# Patient Record
Sex: MALE | Race: WHITE | NOT HISPANIC OR LATINO | URBAN - METROPOLITAN AREA
[De-identification: names, ages, dates, MRNs, and addresses within clinical notes are randomized per-mention and may not be internally consistent; named-entity substitution may affect disease eponyms.]

---

## 2018-05-20 ENCOUNTER — EMERGENCY (EMERGENCY)
Facility: HOSPITAL | Age: 23
LOS: 1 days | Discharge: HOME | End: 2018-05-20
Attending: EMERGENCY MEDICINE | Admitting: EMERGENCY MEDICINE
Payer: COMMERCIAL

## 2018-05-20 VITALS
SYSTOLIC BLOOD PRESSURE: 121 MMHG | RESPIRATION RATE: 18 BRPM | OXYGEN SATURATION: 100 % | TEMPERATURE: 98 F | HEART RATE: 78 BPM | DIASTOLIC BLOOD PRESSURE: 68 MMHG

## 2018-05-20 PROCEDURE — 99283 EMERGENCY DEPT VISIT LOW MDM: CPT | Mod: 25

## 2018-05-20 NOTE — ED ADULT TRIAGE NOTE - CHIEF COMPLAINT QUOTE
Pt reports right knee injury while at hockey game now c/o redness, swelling, and inability weight bear on extremity. PMH Celiac disease

## 2018-05-21 PROCEDURE — 73562 X-RAY EXAM OF KNEE 3: CPT | Mod: 26,RT

## 2018-05-21 NOTE — ED PROVIDER NOTE - MUSCULOSKELETAL, MLM
TTP over the patella.  No instability appreciated but there is pain with manipulation of the meniscus.  Good distal PMS

## 2018-05-21 NOTE — ED PROVIDER NOTE - OBJECTIVE STATEMENT
23 yo playing hockey went knee to knee with another player.  Now feeling pain and instability with ambulation of the R knee.  No other injuries reported

## 2018-08-17 ENCOUNTER — EMERGENCY (EMERGENCY)
Facility: HOSPITAL | Age: 23
LOS: 0 days | Discharge: HOME | End: 2018-08-17
Attending: EMERGENCY MEDICINE | Admitting: EMERGENCY MEDICINE
Payer: COMMERCIAL

## 2018-08-17 VITALS
DIASTOLIC BLOOD PRESSURE: 69 MMHG | SYSTOLIC BLOOD PRESSURE: 127 MMHG | HEART RATE: 79 BPM | OXYGEN SATURATION: 99 % | RESPIRATION RATE: 16 BRPM | TEMPERATURE: 98 F

## 2018-08-17 VITALS — HEIGHT: 70 IN | WEIGHT: 134.92 LBS

## 2018-08-17 DIAGNOSIS — L29.9 PRURITUS, UNSPECIFIED: ICD-10-CM
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
## 2018-08-17 DIAGNOSIS — S80.861A INSECT BITE (NONVENOMOUS), RIGHT LOWER LEG, INITIAL ENCOUNTER: ICD-10-CM

## 2018-08-17 DIAGNOSIS — M23.50 CHRONIC INSTABILITY OF KNEE, UNSPECIFIED KNEE: ICD-10-CM
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
## 2018-08-17 DIAGNOSIS — S80.862A INSECT BITE (NONVENOMOUS), LEFT LOWER LEG, INITIAL ENCOUNTER: ICD-10-CM

## 2018-08-17 PROCEDURE — 99283 EMERGENCY DEPT VISIT LOW MDM: CPT

## 2018-08-17 RX ADMIN — Medication 200 MILLIGRAM(S): at 22:12

## 2018-08-17 NOTE — ED STATDOCS - OBJECTIVE STATEMENT
22 y/o male with a PMHx torn ACL (surgery scheduled for 8/30/18) presents to the ED c/o multiple insect bites. Pt states he went to the beach, hiking, and a state park today and was walking in tall grasses. Pt reports seeing small bugs on his skin and believes he's removed all of them at time of evaluation. Reports some itching. Denies seeing any large ticks on himself. No other acute complaints at time of eval. NKDA.

## 2018-08-17 NOTE — ED STATDOCS - MEDICAL DECISION MAKING DETAILS
Pt presenting with possible tick exposure. No current ticks. Will treat with prophylactic dose of doxycycline and d/c. Pt has been advised to clean their sheets, clothes, and shower.

## 2018-08-17 NOTE — ED ADULT NURSE NOTE - CHIEF COMPLAINT QUOTE
Pt was at [place] and removed 4 ticks from multiple sites on body, concerned there may be more on his body.

## 2018-08-17 NOTE — ED ADULT NURSE NOTE - NSIMPLEMENTINTERV_GEN_ALL_ED
Implemented All Universal Safety Interventions:  [place] to call system. Call bell, personal items and telephone within reach. Instruct patient to call for assistance. Room bathroom lighting operational. Non-slip footwear when patient is off stretcher. Physically safe environment: no spills, clutter or unnecessary equipment. Stretcher in lowest position, wheels locked, appropriate side rails in place.

## 2018-08-21 PROBLEM — S83.519A SPRAIN OF ANTERIOR CRUCIATE LIGAMENT OF UNSPECIFIED KNEE, INITIAL ENCOUNTER: Chronic | Status: ACTIVE | Noted: 2018-08-17

## 2018-08-24 ENCOUNTER — OUTPATIENT (OUTPATIENT)
Dept: OUTPATIENT SERVICES | Facility: HOSPITAL | Age: 23
LOS: 1 days | End: 2018-08-24
Payer: COMMERCIAL

## 2018-08-24 VITALS
OXYGEN SATURATION: 98 % | TEMPERATURE: 98 F | WEIGHT: 132.06 LBS | RESPIRATION RATE: 17 BRPM | DIASTOLIC BLOOD PRESSURE: 69 MMHG | HEIGHT: 70 IN | SYSTOLIC BLOOD PRESSURE: 112 MMHG | HEART RATE: 74 BPM

## 2018-08-24 DIAGNOSIS — M25.561 PAIN IN RIGHT KNEE: ICD-10-CM

## 2018-08-24 DIAGNOSIS — Z01.818 ENCOUNTER FOR OTHER PREPROCEDURAL EXAMINATION: ICD-10-CM

## 2018-08-24 LAB
ALBUMIN SERPL ELPH-MCNC: 4.3 G/DL — SIGNIFICANT CHANGE UP (ref 3.3–5)
ALP SERPL-CCNC: 55 U/L — SIGNIFICANT CHANGE UP (ref 40–120)
ALT FLD-CCNC: 25 U/L — SIGNIFICANT CHANGE UP (ref 12–78)
ANION GAP SERPL CALC-SCNC: 5 MMOL/L — SIGNIFICANT CHANGE UP (ref 5–17)
AST SERPL-CCNC: 19 U/L — SIGNIFICANT CHANGE UP (ref 15–37)
BILIRUB SERPL-MCNC: 0.5 MG/DL — SIGNIFICANT CHANGE UP (ref 0.2–1.2)
BUN SERPL-MCNC: 20 MG/DL — SIGNIFICANT CHANGE UP (ref 7–23)
CALCIUM SERPL-MCNC: 9.2 MG/DL — SIGNIFICANT CHANGE UP (ref 8.5–10.1)
CHLORIDE SERPL-SCNC: 107 MMOL/L — SIGNIFICANT CHANGE UP (ref 96–108)
CO2 SERPL-SCNC: 30 MMOL/L — SIGNIFICANT CHANGE UP (ref 22–31)
CREAT SERPL-MCNC: 0.8 MG/DL — SIGNIFICANT CHANGE UP (ref 0.5–1.3)
GLUCOSE SERPL-MCNC: 91 MG/DL — SIGNIFICANT CHANGE UP (ref 70–99)
HCT VFR BLD CALC: 41.8 % — SIGNIFICANT CHANGE UP (ref 39–50)
HGB BLD-MCNC: 14.2 G/DL — SIGNIFICANT CHANGE UP (ref 13–17)
MCHC RBC-ENTMCNC: 28.6 PG — SIGNIFICANT CHANGE UP (ref 27–34)
MCHC RBC-ENTMCNC: 34 GM/DL — SIGNIFICANT CHANGE UP (ref 32–36)
MCV RBC AUTO: 84.1 FL — SIGNIFICANT CHANGE UP (ref 80–100)
NRBC # BLD: 0 /100 WBCS — SIGNIFICANT CHANGE UP (ref 0–0)
PLATELET # BLD AUTO: 179 K/UL — SIGNIFICANT CHANGE UP (ref 150–400)
POTASSIUM SERPL-MCNC: 4.4 MMOL/L — SIGNIFICANT CHANGE UP (ref 3.5–5.3)
POTASSIUM SERPL-SCNC: 4.4 MMOL/L — SIGNIFICANT CHANGE UP (ref 3.5–5.3)
PROT SERPL-MCNC: 7.2 G/DL — SIGNIFICANT CHANGE UP (ref 6–8.3)
RBC # BLD: 4.97 M/UL — SIGNIFICANT CHANGE UP (ref 4.2–5.8)
RBC # FLD: 12.3 % — SIGNIFICANT CHANGE UP (ref 10.3–14.5)
SODIUM SERPL-SCNC: 142 MMOL/L — SIGNIFICANT CHANGE UP (ref 135–145)
WBC # BLD: 5.81 K/UL — SIGNIFICANT CHANGE UP (ref 3.8–10.5)
WBC # FLD AUTO: 5.81 K/UL — SIGNIFICANT CHANGE UP (ref 3.8–10.5)

## 2018-08-24 PROCEDURE — 85027 COMPLETE CBC AUTOMATED: CPT

## 2018-08-24 PROCEDURE — 80053 COMPREHEN METABOLIC PANEL: CPT

## 2018-08-24 PROCEDURE — G0463: CPT

## 2018-08-24 PROCEDURE — 36415 COLL VENOUS BLD VENIPUNCTURE: CPT

## 2018-08-24 NOTE — H&P PST ADULT - PROBLEM SELECTOR PLAN 1
PST: CBC,CMP  Medical evaluation with Dr. VALENTE Kelly  All preoperative instructions including CHD cleanse reviewed with patient who verbalized understanding.   Avoid all NSAID/ASA containing products as well as all herbal/vitamin supplements. Tylenol only for pain/headache.

## 2018-08-24 NOTE — H&P PST ADULT - ASSESSMENT
This 22 yo male with a PMHX of ADHD, Depression Celiac disease  presents to PST for a scheduled Right knee Arthroscopy ACL reconstruction  with Dr Montez  on 8/30/18

## 2018-08-24 NOTE — H&P PST ADULT - HISTORY OF PRESENT ILLNESS
This   presents to PST for a scheduled   with Dr juan  . This 24 yo male with a PMHX of ADHD, Depression Celiac disease  presents to [facility] for a scheduled Right knee Arthroscopy ACL reconstruction  with Dr Montez  on 8/30/18 . He was playing hockey and had a knee to knee collision. He chris a pop at that time but continued to play. He developed knee instability afterward. MRI revealed Bone marrow contusion pattern consistent with a pivot shift mechanism of injury which has reulsted in a full thickness ACL tear. Associated knee joint effusion.

## 2018-08-24 NOTE — H&P PST ADULT - NSANTHOSAYNRD_GEN_A_CORE
No. NÉSTOR screening performed.  STOP BANG Legend: 0-2 = LOW Risk; 3-4 = INTERMEDIATE Risk; 5-8 = HIGH Risk

## 2018-08-29 ENCOUNTER — TRANSCRIPTION ENCOUNTER (OUTPATIENT)
Age: 23
End: 2018-08-29

## 2018-08-30 ENCOUNTER — OUTPATIENT (OUTPATIENT)
Dept: OUTPATIENT SERVICES | Facility: HOSPITAL | Age: 23
LOS: 1 days | End: 2018-08-30
Payer: COMMERCIAL

## 2018-08-30 ENCOUNTER — RESULT REVIEW (OUTPATIENT)
Age: 23
End: 2018-08-30

## 2018-08-30 VITALS
RESPIRATION RATE: 13 BRPM | TEMPERATURE: 98 F | SYSTOLIC BLOOD PRESSURE: 118 MMHG | OXYGEN SATURATION: 97 % | DIASTOLIC BLOOD PRESSURE: 76 MMHG | WEIGHT: 132.06 LBS | HEIGHT: 70 IN | HEART RATE: 69 BPM

## 2018-08-30 VITALS — TEMPERATURE: 99 F

## 2018-08-30 DIAGNOSIS — M25.561 PAIN IN RIGHT KNEE: ICD-10-CM

## 2018-08-30 PROCEDURE — 29888 ARTHRS AID ACL RPR/AGMNTJ: CPT | Mod: RT

## 2018-08-30 PROCEDURE — C1713: CPT

## 2018-08-30 PROCEDURE — 88304 TISSUE EXAM BY PATHOLOGIST: CPT | Mod: 26

## 2018-08-30 PROCEDURE — 88304 TISSUE EXAM BY PATHOLOGIST: CPT

## 2018-08-30 RX ORDER — ACETAMINOPHEN 500 MG
1000 TABLET ORAL ONCE
Qty: 0 | Refills: 0 | Status: COMPLETED | OUTPATIENT
Start: 2018-08-30 | End: 2018-08-30

## 2018-08-30 RX ORDER — SODIUM CHLORIDE 9 MG/ML
1000 INJECTION, SOLUTION INTRAVENOUS
Qty: 0 | Refills: 0 | Status: DISCONTINUED | OUTPATIENT
Start: 2018-08-30 | End: 2018-08-30

## 2018-08-30 RX ORDER — OXYCODONE HYDROCHLORIDE 5 MG/1
5 TABLET ORAL ONCE
Qty: 0 | Refills: 0 | Status: DISCONTINUED | OUTPATIENT
Start: 2018-08-30 | End: 2018-08-30

## 2018-08-30 RX ORDER — ONDANSETRON 8 MG/1
4 TABLET, FILM COATED ORAL ONCE
Qty: 0 | Refills: 0 | Status: DISCONTINUED | OUTPATIENT
Start: 2018-08-30 | End: 2018-08-30

## 2018-08-30 RX ORDER — METOCLOPRAMIDE HCL 10 MG
10 TABLET ORAL ONCE
Qty: 0 | Refills: 0 | Status: DISCONTINUED | OUTPATIENT
Start: 2018-08-30 | End: 2018-08-30

## 2018-08-30 RX ORDER — CEFAZOLIN SODIUM 1 G
2000 VIAL (EA) INJECTION ONCE
Qty: 0 | Refills: 0 | Status: COMPLETED | OUTPATIENT
Start: 2018-08-30 | End: 2018-08-30

## 2018-08-30 RX ORDER — MORPHINE SULFATE 50 MG/1
4 CAPSULE, EXTENDED RELEASE ORAL ONCE
Qty: 0 | Refills: 0 | Status: DISCONTINUED | OUTPATIENT
Start: 2018-08-30 | End: 2018-08-30

## 2018-08-30 RX ORDER — HYDROMORPHONE HYDROCHLORIDE 2 MG/ML
0.5 INJECTION INTRAMUSCULAR; INTRAVENOUS; SUBCUTANEOUS
Qty: 0 | Refills: 0 | Status: DISCONTINUED | OUTPATIENT
Start: 2018-08-30 | End: 2018-08-30

## 2018-08-30 RX ORDER — BUPIVACAINE HCL/PF 7.5 MG/ML
400 VIAL (ML) INJECTION
Qty: 0 | Refills: 0 | Status: DISCONTINUED | OUTPATIENT
Start: 2018-08-30 | End: 2018-09-14

## 2018-08-30 RX ADMIN — HYDROMORPHONE HYDROCHLORIDE 0.5 MILLIGRAM(S): 2 INJECTION INTRAMUSCULAR; INTRAVENOUS; SUBCUTANEOUS at 11:44

## 2018-08-30 RX ADMIN — HYDROMORPHONE HYDROCHLORIDE 0.5 MILLIGRAM(S): 2 INJECTION INTRAMUSCULAR; INTRAVENOUS; SUBCUTANEOUS at 11:29

## 2018-08-30 RX ADMIN — SODIUM CHLORIDE 75 MILLILITER(S): 9 INJECTION, SOLUTION INTRAVENOUS at 11:04

## 2018-08-30 RX ADMIN — SODIUM CHLORIDE 75 MILLILITER(S): 9 INJECTION, SOLUTION INTRAVENOUS at 06:45

## 2018-08-30 RX ADMIN — Medication 4 MILLILITER(S): at 11:04

## 2018-08-30 NOTE — ASU DISCHARGE PLAN (ADULT/PEDIATRIC). - SPECIAL INSTRUCTIONS
1) Your knee will swell over the next 48hours and you can expect pain to get a bit worse. Ice your Knee plenty.    2) Elevate your leg above your heart with about 3 pillows when you can, when you are in bed or chair.     3) Expect some bloody drainage. It is normal.    4) Use CPM machine as directed    5) OnQ Pain Pump, start at 8 and can go up or down depending on how bad your pain is    6) Call the office to schedule a follow up appointment to be seen in 1 week. Your Sutures will be removed at that point.    7) A pain Rx is in the chart; fill it on your way home. OR was sent electronically to your pharmacy, pick it up on the way home. 1) Your knee will swell over the next 48hours and you can expect pain to get a bit worse. Ice your Knee plenty. Can remove dressing in 72 hours and replace with waterproof bandaid.    2) Elevate your leg above your heart with about 3 pillows when you can, when you are in bed or chair.     3) Expect some bloody drainage. It is normal.    4) Use CPM machine as directed    5) OnQ Pain Pump, start at 8 and can go up or down depending on how bad your pain is    6) Call the office to schedule a follow up appointment to be seen in 1 week. Your Sutures will be removed at that point.    7) A pain Rx is in the chart; fill it on your way home. OR was sent electronically to your pharmacy, pick it up on the way home.

## 2018-08-30 NOTE — ASU DISCHARGE PLAN (ADULT/PEDIATRIC). - MEDICATION SUMMARY - MEDICATIONS TO TAKE
I will START or STAY ON the medications listed below when I get home from the hospital:    oxyCODONE-acetaminophen 5 mg-325 mg oral tablet  -- 1 tab(s) by mouth every 8 hours, As Needed  -for severe pain MDD:4-6  -- Caution federal law prohibits the transfer of this drug to any person other  than the person for whom it was prescribed.  May cause drowsiness.  Alcohol may intensify this effect.  Use care when operating dangerous machinery.  This prescription cannot be refilled.  This product contains acetaminophen.  Do not use  with any other product containing acetaminophen to prevent possible liver damage.  Using more of this medication than prescribed may cause serious breathing problems.    -- Indication: For prn pain    Zoloft 100 mg oral tablet  -- 1 tab(s) by mouth once a day (at bedtime)  -- Indication: For home med    Zoloft 50 mg oral tablet  -- 1 tab(s) by mouth once a day (at bedtime)  -- Indication: For home med    Abilify 2 mg oral tablet  -- 1 tab(s) by mouth once a day (at bedtime)  -- Indication: For home med    Ritalin 10 mg oral tablet  -- 1 tab(s) by mouth 2 times a day  -- Indication: For home med

## 2018-08-30 NOTE — ASU DISCHARGE PLAN (ADULT/PEDIATRIC). - ACTIVITY LEVEL
no sports/gym/no exercise Weight bearing as tolerated with crutches, in knee immobilizer/no sports/gym/no exercise/weight bearing as tolerated

## 2018-08-30 NOTE — BRIEF OPERATIVE NOTE - PROCEDURE
<<-----Click on this checkbox to enter Procedure ACL - reconstruction of anterior cruciate ligament  08/30/2018    Active  NFRANE

## 2018-08-31 LAB — SURGICAL PATHOLOGY FINAL REPORT - CH: SIGNIFICANT CHANGE UP

## 2018-08-31 NOTE — PROGRESS NOTE ADULT - SUBJECTIVE AND OBJECTIVE BOX
Post Op Day __1_ of Anesthesia Pain Management Service    SUBJECTIVE:  		  OBJECTIVE:   Pain Score:  8 /10  Therapy:	[ ] IV PCA	[ ] Epidural   [ ] s/p Spinal Opioid	[x ] Peripheral nerve block  	  Vital Signs Last 24 Hrs  T(C): --  T(F): --  HR: --  BP: --  BP(mean): --  RR: --  SpO2: --    (x ) Alert & Oriented     (x ) No motor/sensory block     ( ) Nausea     ( ) Pruritis     ( ) Headache    ( ) Catheter Site Unremarkable    Assessment of Catheter Site:	[ ] Intact		[ ] Other:    (x) Further Pain Rx Plan:  Oral Pain Medications    COMMENTS :Patient did until block wore off      ANTICOAGULATION STATUS:

## 2019-10-06 NOTE — ED ADULT TRIAGE NOTE - CADM TRG TX PRIOR TO ARRIVAL
Daily Rounds:    Pt with vomiting x1 yesterday, denies N/V since then  Plan to order carb control diet and fingerstick  Pt calm and cooperative on unit  Labs for AM if refused today  none

## 2019-10-30 ENCOUNTER — INPATIENT (INPATIENT)
Facility: HOSPITAL | Age: 24
LOS: 4 days | Discharge: HOME | DRG: 885 | End: 2019-11-04
Attending: PSYCHIATRY & NEUROLOGY | Admitting: PSYCHIATRY & NEUROLOGY
Payer: COMMERCIAL

## 2019-10-30 VITALS
SYSTOLIC BLOOD PRESSURE: 122 MMHG | DIASTOLIC BLOOD PRESSURE: 76 MMHG | TEMPERATURE: 98 F | HEART RATE: 62 BPM | RESPIRATION RATE: 17 BRPM | OXYGEN SATURATION: 100 %

## 2019-10-30 DIAGNOSIS — F41.9 ANXIETY DISORDER, UNSPECIFIED: ICD-10-CM

## 2019-10-30 DIAGNOSIS — F40.01 AGORAPHOBIA WITH PANIC DISORDER: ICD-10-CM

## 2019-10-30 DIAGNOSIS — F33.2 MAJOR DEPRESSIVE DISORDER, RECURRENT SEVERE WITHOUT PSYCHOTIC FEATURES: ICD-10-CM

## 2019-10-30 DIAGNOSIS — F60.3 BORDERLINE PERSONALITY DISORDER: ICD-10-CM

## 2019-10-30 DIAGNOSIS — F41.1 GENERALIZED ANXIETY DISORDER: ICD-10-CM

## 2019-10-30 PROBLEM — J45.909 UNSPECIFIED ASTHMA, UNCOMPLICATED: Chronic | Status: ACTIVE | Noted: 2018-08-23

## 2019-10-30 PROBLEM — F90.9 ATTENTION-DEFICIT HYPERACTIVITY DISORDER, UNSPECIFIED TYPE: Chronic | Status: ACTIVE | Noted: 2018-08-23

## 2019-10-30 LAB
ALBUMIN SERPL ELPH-MCNC: 4.3 G/DL — SIGNIFICANT CHANGE UP (ref 3.3–5)
ALP SERPL-CCNC: 68 U/L — SIGNIFICANT CHANGE UP (ref 40–120)
ALT FLD-CCNC: 22 U/L — SIGNIFICANT CHANGE UP (ref 12–78)
ANION GAP SERPL CALC-SCNC: 3 MMOL/L — LOW (ref 5–17)
APAP SERPL-MCNC: < 2 UG/ML (ref 10–30)
AST SERPL-CCNC: 12 U/L — LOW (ref 15–37)
BASOPHILS # BLD AUTO: 0.02 K/UL — SIGNIFICANT CHANGE UP (ref 0–0.2)
BASOPHILS NFR BLD AUTO: 0.3 % — SIGNIFICANT CHANGE UP (ref 0–2)
BILIRUB SERPL-MCNC: 0.5 MG/DL — SIGNIFICANT CHANGE UP (ref 0.2–1.2)
BUN SERPL-MCNC: 17 MG/DL — SIGNIFICANT CHANGE UP (ref 7–23)
CALCIUM SERPL-MCNC: 8.9 MG/DL — SIGNIFICANT CHANGE UP (ref 8.5–10.1)
CHLORIDE SERPL-SCNC: 105 MMOL/L — SIGNIFICANT CHANGE UP (ref 96–108)
CO2 SERPL-SCNC: 31 MMOL/L — SIGNIFICANT CHANGE UP (ref 22–31)
CREAT SERPL-MCNC: 1.13 MG/DL — SIGNIFICANT CHANGE UP (ref 0.5–1.3)
EOSINOPHIL # BLD AUTO: 0.14 K/UL — SIGNIFICANT CHANGE UP (ref 0–0.5)
EOSINOPHIL NFR BLD AUTO: 2.1 % — SIGNIFICANT CHANGE UP (ref 0–6)
ETHANOL SERPL-MCNC: <10 MG/DL — SIGNIFICANT CHANGE UP (ref 0–10)
GLUCOSE SERPL-MCNC: 87 MG/DL — SIGNIFICANT CHANGE UP (ref 70–99)
HCT VFR BLD CALC: 46.1 % — SIGNIFICANT CHANGE UP (ref 39–50)
HGB BLD-MCNC: 15.3 G/DL — SIGNIFICANT CHANGE UP (ref 13–17)
IMM GRANULOCYTES NFR BLD AUTO: 0.3 % — SIGNIFICANT CHANGE UP (ref 0–1.5)
LYMPHOCYTES # BLD AUTO: 1.81 K/UL — SIGNIFICANT CHANGE UP (ref 1–3.3)
LYMPHOCYTES # BLD AUTO: 27.5 % — SIGNIFICANT CHANGE UP (ref 13–44)
MCHC RBC-ENTMCNC: 29.4 PG — SIGNIFICANT CHANGE UP (ref 27–34)
MCHC RBC-ENTMCNC: 33.2 GM/DL — SIGNIFICANT CHANGE UP (ref 32–36)
MCV RBC AUTO: 88.5 FL — SIGNIFICANT CHANGE UP (ref 80–100)
MONOCYTES # BLD AUTO: 0.49 K/UL — SIGNIFICANT CHANGE UP (ref 0–0.9)
MONOCYTES NFR BLD AUTO: 7.4 % — SIGNIFICANT CHANGE UP (ref 2–14)
NEUTROPHILS # BLD AUTO: 4.11 K/UL — SIGNIFICANT CHANGE UP (ref 1.8–7.4)
NEUTROPHILS NFR BLD AUTO: 62.4 % — SIGNIFICANT CHANGE UP (ref 43–77)
PCP SPEC-MCNC: SIGNIFICANT CHANGE UP
PLATELET # BLD AUTO: 179 K/UL — SIGNIFICANT CHANGE UP (ref 150–400)
POTASSIUM SERPL-MCNC: 3.3 MMOL/L — LOW (ref 3.5–5.3)
POTASSIUM SERPL-SCNC: 3.3 MMOL/L — LOW (ref 3.5–5.3)
PROT SERPL-MCNC: 7.4 GM/DL — SIGNIFICANT CHANGE UP (ref 6–8.3)
RBC # BLD: 5.21 M/UL — SIGNIFICANT CHANGE UP (ref 4.2–5.8)
RBC # FLD: 13.1 % — SIGNIFICANT CHANGE UP (ref 10.3–14.5)
SALICYLATES SERPL-MCNC: <1.7 MG/DL — LOW (ref 2.8–20)
SODIUM SERPL-SCNC: 139 MMOL/L — SIGNIFICANT CHANGE UP (ref 135–145)
TSH SERPL-MCNC: 1.22 UU/ML — SIGNIFICANT CHANGE UP (ref 0.34–4.82)
WBC # BLD: 6.59 K/UL — SIGNIFICANT CHANGE UP (ref 3.8–10.5)
WBC # FLD AUTO: 6.59 K/UL — SIGNIFICANT CHANGE UP (ref 3.8–10.5)

## 2019-10-30 PROCEDURE — 80048 BASIC METABOLIC PNL TOTAL CA: CPT

## 2019-10-30 PROCEDURE — 93010 ELECTROCARDIOGRAM REPORT: CPT

## 2019-10-30 PROCEDURE — 84443 ASSAY THYROID STIM HORMONE: CPT

## 2019-10-30 PROCEDURE — 36415 COLL VENOUS BLD VENIPUNCTURE: CPT

## 2019-10-30 PROCEDURE — 80061 LIPID PANEL: CPT

## 2019-10-30 PROCEDURE — 83036 HEMOGLOBIN GLYCOSYLATED A1C: CPT

## 2019-10-30 RX ORDER — ACETAMINOPHEN 500 MG
650 TABLET ORAL EVERY 6 HOURS
Refills: 0 | Status: DISCONTINUED | OUTPATIENT
Start: 2019-10-30 | End: 2019-11-04

## 2019-10-30 RX ORDER — QUETIAPINE FUMARATE 200 MG/1
100 TABLET, FILM COATED ORAL AT BEDTIME
Refills: 0 | Status: DISCONTINUED | OUTPATIENT
Start: 2019-10-30 | End: 2019-11-04

## 2019-10-30 RX ORDER — CLONAZEPAM 1 MG
0.5 TABLET ORAL
Refills: 0 | Status: DISCONTINUED | OUTPATIENT
Start: 2019-10-30 | End: 2019-11-04

## 2019-10-30 RX ORDER — MAGNESIUM HYDROXIDE 400 MG/1
30 TABLET, CHEWABLE ORAL DAILY
Refills: 0 | Status: DISCONTINUED | OUTPATIENT
Start: 2019-10-30 | End: 2019-11-04

## 2019-10-30 RX ORDER — SERTRALINE 25 MG/1
100 TABLET, FILM COATED ORAL DAILY
Refills: 0 | Status: DISCONTINUED | OUTPATIENT
Start: 2019-10-31 | End: 2019-11-04

## 2019-10-30 RX ADMIN — QUETIAPINE FUMARATE 100 MILLIGRAM(S): 200 TABLET, FILM COATED ORAL at 21:16

## 2019-10-30 RX ADMIN — Medication 0.5 MILLIGRAM(S): at 19:17

## 2019-10-30 NOTE — ED BEHAVIORAL HEALTH ASSESSMENT NOTE - SUICIDE RISK FACTORS
Anhedonia/Hopelessness or despair/Current mood episode/Agitation/Severe Anxiety/Panic/Insomnia/Mood Disorder current/past

## 2019-10-30 NOTE — ED STATDOCS - PROGRESS NOTE DETAILS
Fifi PANTOJA for ED attending, Dr. Syed: 23 y/o male with PMHx of Celiac's disease, asthma, anxiety, depression, ADHD, s/p ACL reconstruction surgery presents to the ED regarding depression, suicidal thoughts. States his psychiatrist Dr. Ball called pt saying he should be staying at as an inpatient in regards to his worsening depression. Pt admits to having thoughts of hurting himself, denies any plan. Pt reports his depression has been worsening, thought about going to a outpatient program which he has done twice, but has come to an agreement to be seen as in patient. +vapes. No EtOH or illicit drug use. On Zoloft, Klonopin, Seroquel. Will send pt to main ED for further evaluation.

## 2019-10-30 NOTE — ED BEHAVIORAL HEALTH ASSESSMENT NOTE - HPI (INCLUDE ILLNESS QUALITY, SEVERITY, DURATION, TIMING, CONTEXT, MODIFYING FACTORS, ASSOCIATED SIGNS AND SYMPTOMS)
24 year-old  male, with history of Major Depressive Disorder without psychotic features, Generalized Anxiety Disorder, Agoraphobia with panic disorder, history of multiple psychotic trial, Borderline Personality Disorder, chronic passive suicidal ideation and chronic feelings of emptiness, two prior suicide attempt via overdose ~2014 but with no prior in-patient hospitalization, chronic self-injurious behaviors: cuts daily for past month, no abuse or trauma, no substance abuse, no legal history, presents referred by out-patient psychiatrist and brought in by father for worsening depression and suicidal ideation.    Patient presents with blunted affect, poor eye contact, complaining of depressed mood, severe, with anhedonia, anergia, amotivation, difficulty getting out of bed, and hopelessness. Endorsing helplessness, worthlessness. Reports poor appetite and poor sleep hygiene. Reports passive suicidal ideation denying current active suicidal ideation/intent/plan, stating last active suicidal ideation being ~10/15/2019 where he aborted driving to train station with plan of jumping in front of train, with barrier being thoughts of how his suicide attempt would affect family / friends. Reports this is still a barrier to suicide. Reports chronic anxiety, exacerbated in social settings, with panic. Reports his mood does fluctuate, gets happy, and periods of racing thoughts, about once weekly. Denies other periods of manic like symptoms. Denies psychotic symptoms. Denies substance abuse. Denies assault ideation/intent/plan and homicidal ideation/intent/plan.     Dr. Ball, out-patient psychiatrist, corroborates history, adding that patient chronic passive suicidal ideation however recent active suicidal ideation (did not state it was an aborted suicide attempt with plan and intent of jumping in front of train). Reports concerning for patient safety secondary to worsening depression and failed TMS. Patient has multiple failed trials of medications. Recommends ECT treatment.

## 2019-10-30 NOTE — ED PROVIDER NOTE - OBJECTIVE STATEMENT
25 y/o male with a PMHx of Anxiety, Depression, presents to the ED c/o suicidal thought. Pt states he has been depressed "for years". Pt has SI and spoke with Dr. Cazares and was sent for admission. Denies visual or auditory hallucination. States he had a plan to jump in front of a train a couple of weeks ago. 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 Current smoker. NKDA. Psychiatrist: Dr. Cazares. 25 y/o male with a PMHx of Asthma, Anxiety, Depression, presents to the ED c/o suicidal thoughts. Pt states he has been depressed "for years". Pt has SI and spoke with Dr. Cazares and was sent for admission. Denies visual or auditory hallucination. States he had a plan to jump in front of a train a couple of weeks ago. Is on Klonopin, Zoloft, and Seroquel. Current smoker. NKDA. Psychiatrist: Dr. Cazares.

## 2019-10-30 NOTE — ED BEHAVIORAL HEALTH ASSESSMENT NOTE - PAST PSYCHOTROPIC MEDICATION
Prozac 60 mg (2016 for one year partial effect); Lexapro 10 mg (2016 - one week trial - caused akathesia); Effexor  (2016 for one month - felt foggy); Abilify 10 mg (2016 - adjunct to Zoloft and Klonopin - mild improvement - no remission); Klonopin 0.25 mg QID; Concerta; Ritalin 10 mg twice daily (2017 - feeling shaky)

## 2019-10-30 NOTE — CONSULT NOTE ADULT - SUBJECTIVE AND OBJECTIVE BOX
Chart reviewed.  Patient seen and examined on [address] [place] [address] treatment room    CC: Suicidal Ideation    HPI: 24 year-old  Man with hx celiac disease, lactose intolerance, childhood asthma (last flare up was age 8) MDD without psychotic features, RONNY, Agoraphobia with panic disorder, history of multiple psychotic trial, Borderline Personality Disorder, suicidal ideation, self-injurious behavior onset was as a 8th grader (was clean x 1 yr then relaspse 1 month ago) 2 SA via overdose ~2014 but with no prior in-patient hospitalization, brought in to  ED referred by out-patient psychiatrist for worsening depression and suicidal ideation.    In the ED K 3.3 otherwise CMP wnl, CBC wnl, Ua eliana, Utox +benzo otherwise negative, vitals wnl    Subjective/ROS: currently denies SI/HI; Denies CP/palpitation/SOB/HA/dizziness/vision changes/urinary changes/abd pain/n/v/d/f/c,/numbness/tingling  10 systems ROS negative except as above      MEDICATIONS  (STANDING):  clonazePAM  Tablet 0.5 milliGRAM(s) Oral two times a day  QUEtiapine 100 milliGRAM(s) Oral at bedtime    MEDICATIONS  (PRN):  acetaminophen   Tablet .. 650 milliGRAM(s) Oral every 6 hours PRN Temp greater or equal to 38C (100.4F), Mild Pain (1 - 3), Moderate Pain (4 - 6)  aluminum hydroxide/magnesium hydroxide/simethicone Suspension 30 milliLiter(s) Oral every 6 hours PRN Dyspepsia  magnesium hydroxide Suspension 30 milliLiter(s) Oral daily PRN Constipation      PHYSICAL EXAM:    HEENT:  pupils equal and reactive, EOMI, no oropharyngeal lesions, erythema, exudates, oral thrush    NECK:   supple, no carotid bruits, no palpable lymph nodes, no thyromegaly    CV:  +S1, +S2, regular, no murmurs or rubs    RESP:   lungs clear to auscultation bilaterally, no wheezing, rales, rhonchi, good air entry bilaterally    GI:  abdomen soft, non-tender, non-distended, normal BS, no bruits, no abdominal masses, no palpable masses    RECTAL:  not examined    :  no suprapubic tenderness    MSK/Ext/Skin   normal muscle tone, no atrophy, no rigidity, no contractions, no c/c/e, BLE thighs with numerous superficial vertical lacerations (self cutting) R>L, no active bleeding no s/s infection otherwise no rash/lesions/wounds    VASCULAR:  pulses equal and symmetric in the upper and lower extremities    NEURO:  AAOX3, no focal neurological deficits, follows all commands, able to move extremities spontaneously    Psych: pleasant, cooperative, keeps head covered with sweater hoodie    VITALS:  Last 24 Hrs  T(C): 36.4 (30 Oct 2019 20:00), Max: 36.5 (30 Oct 2019 11:41)  T(F): 97.6 (30 Oct 2019 20:00), Max: 97.7 (30 Oct 2019 11:41)  HR: 62 (30 Oct 2019 11:41) (62 - 62)  BP: 122/76 (30 Oct 2019 11:41) (122/76 - 122/76)  BP(mean): --  RR: 14 (30 Oct 2019 20:00) (14 - 17)  SpO2: 100% (30 Oct 2019 20:00) (100% - 100%)    LABS:                        15.3   6.59  )-----------( 179      ( 30 Oct 2019 12:17 )             46.1     10-30    139  |  105  |  17  ----------------------------<  87  3.3<L>   |  31  |  1.13    Ca    8.9      30 Oct 2019 12:17    TPro  7.4  /  Alb  4.3  /  TBili  0.5  /  DBili  x   /  AST  12<L>  /  ALT  22  /  AlkPhos  68  10-30    LIVER FUNCTIONS - ( 30 Oct 2019 12:17 )  Alb: 4.3 g/dL / Pro: 7.4 gm/dL / ALK PHOS: 68 U/L / ALT: 22 U/L / AST: 12 U/L / GGT: x Chart reviewed.  Patient seen and examined on [address] [place] [address] treatment room    CC: Suicidal Ideation    HPI: 24 year-old  Man with hx celiac disease, lactose intolerance, childhood asthma (last flare up was age 8) MDD without psychotic features, RONNY, Agoraphobia with panic disorder, history of multiple psychotic trial, Borderline Personality Disorder, suicidal ideation, self-injurious behavior (onset was as a 8th grader and was clean x 1 yr then relapsed 1 month ago) with 2 SA via overdose ~2014 but with no prior in-patient hospitalization, brought in to  ED referred by out-patient psychiatrist for worsening depression and suicidal ideation.    In the ED K 3.3 otherwise CMP wnl, CBC wnl, Ua eliana, Utox +benzo otherwise negative, vitals wnl    Subjective/ROS: currently denies SI/HI; Denies CP/palpitation/SOB/HA/dizziness/vision changes/urinary changes/abd pain/n/v/d/f/c,/numbness/tingling  10 systems ROS negative except as above      MEDICATIONS  (STANDING):  clonazePAM  Tablet 0.5 milliGRAM(s) Oral two times a day  QUEtiapine 100 milliGRAM(s) Oral at bedtime    MEDICATIONS  (PRN):  acetaminophen   Tablet .. 650 milliGRAM(s) Oral every 6 hours PRN Temp greater or equal to 38C (100.4F), Mild Pain (1 - 3), Moderate Pain (4 - 6)  aluminum hydroxide/magnesium hydroxide/simethicone Suspension 30 milliLiter(s) Oral every 6 hours PRN Dyspepsia  magnesium hydroxide Suspension 30 milliLiter(s) Oral daily PRN Constipation      PHYSICAL EXAM:    HEENT:  pupils equal and reactive, EOMI, no oropharyngeal lesions, erythema, exudates, oral thrush    NECK:   supple, no carotid bruits, no palpable lymph nodes, no thyromegaly    CV:  +S1, +S2, regular, no murmurs or rubs    RESP:   lungs clear to auscultation bilaterally, no wheezing, rales, rhonchi, good air entry bilaterally    GI:  abdomen soft, non-tender, non-distended, normal BS, no bruits, no abdominal masses, no palpable masses    RECTAL:  not examined    :  no suprapubic tenderness    MSK/Ext/Skin   normal muscle tone, no atrophy, no rigidity, no contractions, no c/c/e, BLE thighs with numerous superficial vertical lacerations (self cutting) R>L, no active bleeding no s/s infection otherwise no rash/lesions/wounds    VASCULAR:  pulses equal and symmetric in the upper and lower extremities    NEURO:  AAOX3, no focal neurological deficits, follows all commands, able to move extremities spontaneously    Psych: pleasant, cooperative, keeps head covered with sweater hoodie    VITALS:  Last 24 Hrs  T(C): 36.4 (30 Oct 2019 20:00), Max: 36.5 (30 Oct 2019 11:41)  T(F): 97.6 (30 Oct 2019 20:00), Max: 97.7 (30 Oct 2019 11:41)  HR: 62 (30 Oct 2019 11:41) (62 - 62)  BP: 122/76 (30 Oct 2019 11:41) (122/76 - 122/76)  BP(mean): --  RR: 14 (30 Oct 2019 20:00) (14 - 17)  SpO2: 100% (30 Oct 2019 20:00) (100% - 100%)    LABS:                        15.3   6.59  )-----------( 179      ( 30 Oct 2019 12:17 )             46.1     10-30    139  |  105  |  17  ----------------------------<  87  3.3<L>   |  31  |  1.13    Ca    8.9      30 Oct 2019 12:17    TPro  7.4  /  Alb  4.3  /  TBili  0.5  /  DBili  x   /  AST  12<L>  /  ALT  22  /  AlkPhos  68  10-30    LIVER FUNCTIONS - ( 30 Oct 2019 12:17 )  Alb: 4.3 g/dL / Pro: 7.4 gm/dL / ALK PHOS: 68 U/L / ALT: 22 U/L / AST: 12 U/L / GGT: x

## 2019-10-30 NOTE — ED BEHAVIORAL HEALTH ASSESSMENT NOTE - DETAILS
see past psychotropic medication section above As per HPI - aborted suicide attempt via jumping in front of train (was driving to station and turned around); chronic passive suicidal ideation of wanting to be dead Dr. Forrest / Adrienne Dr. Ball superficial lacerations on legs

## 2019-10-30 NOTE — ED BEHAVIORAL HEALTH ASSESSMENT NOTE - CURRENT MEDICATION
Zoloft 50 mg (started ~10/15/2019); Seroquel 50 mg at bedtime (started 10/29/2019); Klonopin 0.5 mg twice daily as needed for anxiety (patient is taking 3 mg daily. Hence. needs taper off)

## 2019-10-30 NOTE — CONSULT NOTE ADULT - ATTENDING COMMENTS
States cough and fevers up to 102 since Friday. Also congested with plugged up ears.  States went to [facility] today to get OTC meds and was told by pharmacist at [facility] to get checked for pneumonia.   
Patient was seen and examined by me after initial evaluation above with the nurse practitioner, Rama García.  Case discussed and reviewed in detail with Ms. García, NP. H&P edited where appropriate. Please see my plan below.    24 year-old male with a PMH celiac disease, lactose intolerance, childhood asthma (last flare was age 8), MDD without psychotic features, RONNY, agoraphobia with panic disorder, borderline personality disorder, 2 prior suicide attempts presenting for worsening suicidal ideations. He initially had an idea of throwing himself in front of a train. He currently denies SI/HI. He feels slightly better now. He denies other symptoms such as chest pain, SOB, abdominal pain, nausea, vomiting, fevers, or chills.    Rest of ROS, physical exam, and labwork as above.  EKG shows ST segment changes consistent with early repolarization (no prior EKG for comparison). Sinus bradycardia, normal axis. Rate 48, , QTc 368.    Plan:  1) Suicidal ideation  - Due to severe episode of MDD and RONNY  - Currently SI is improving  - plan per primary team: psychiatry (Zoloft, Clonazepam, and Seroquel)  - social work consult  - supportive therapy  - TSH 1.22    2) MDD (Major Depressive Disorder)  - With severe episode involving suicidal ideations  - No psychotic features  - No current suicidal ideations  - C/w Zoloft, Clonazepam, and Seroquel    3) Hypokalemia  - Mild, likely due to decreased intake  - Replete 40 mEq x1  - Check BMP in AM    4) Superficial lacerations of skin  - To thighs  - Due to self-cutting from borderline personality disorder  - Bacitracin ointment BID    5) Celiac disease  - stable, not on medication  - No diarrhea at this time  - gluten-gliadin-lactose restricted diet    6) Prophylactic measure  - DVT PPX: IMPROVE score of 0, no PPX needed, encourage ambulation  - Diet: gluten-gliadin, lactose intolerant  - Dispo: per psych

## 2019-10-30 NOTE — ED PROVIDER NOTE - NS ED SCRIBE STATEMENT
"    Preventive Care at the 12 - 14 Year Visit    Growth Percentiles & Measurements   Weight: 89 lbs 0 oz / 40.4 kg (actual weight) / 52 %ile based on CDC 2-20 Years weight-for-age data using vitals from 3/15/2017.  Length: 4' 9\" / 144.8 cm 30 %ile based on CDC 2-20 Years stature-for-age data using vitals from 3/15/2017.   BMI: Body mass index is 19.26 kg/(m^2). 72 %ile based on CDC 2-20 Years BMI-for-age data using vitals from 3/15/2017.   Blood Pressure: Blood pressure percentiles are 2.7 % systolic and 21.1 % diastolic based on NHBPEP's 4th Report.     Next Visit    Continue to see your health care provider every one to two years for preventive care.    Nutrition    It s very important to eat breakfast. This will help you make it through the morning.    Sit down with your family for a meal on a regular basis.    Eat healthy meals and snacks, including fruits and vegetables. Avoid salty and sugary snack foods.    Be sure to eat foods that are high in calcium and iron.    Avoid or limit caffeine (often found in soda pop).    Sleeping    Your body needs about 9 hours of sleep each night.    Keep screens (TV, computer, and video) out of the bedroom / sleeping area.  They can lead to poor sleep habits and increased obesity.    Health    Limit TV, computer and video time to one to two hours per day.    Set a goal to be physically fit.  Do some form of exercise every day.  It can be an active sport like skating, running, swimming, team sports, etc.    Try to get 30 to 60 minutes of exercise at least three times a week.    Make healthy choices: don t smoke or drink alcohol; don t use drugs.    In your teen years, you can expect . . .    To develop or strengthen hobbies.    To build strong friendships.    To be more responsible for yourself and your actions.    To be more independent.    To use words that best express your thoughts and feelings.    To develop self-confidence and a sense of self.    To see big differences " in how you and your friends grow and develop.    To have body odor from perspiration (sweating).  Use underarm deodorant each day.    To have some acne, sometimes or all the time.  (Talk with your doctor or nurse about this.)    Girls will usually begin puberty about two years before boys.  o Girls will develop breasts and pubic hair. They will also start their menstrual periods.  o Boys will develop a larger penis and testicles, as well as pubic hair. Their voices will change, and they ll start to have  wet dreams.     Sexuality    It is normal to have sexual feelings.    Find a supportive person who can answer questions about puberty, sexual development, sex, abstinence (choosing not to have sex), sexually transmitted diseases (STDs) and birth control.    Think about how you can say no to sex.    Safety    Accidents are the greatest threat to your health and life.    Always wear a seat belt in the car.    Practice a fire escape plan at home.  Check smoke detector batteries twice a year.    Keep electric items (like blow dryers, razors, curling irons, etc.) away from water.    Wear a helmet and other protective gear when bike riding, skating, skateboarding, etc.    Use sunscreen to reduce your risk of skin cancer.    Learn first aid and CPR (cardiopulmonary resuscitation).    Avoid dangerous behaviors and situations.  For example, never get in a car if the  has been drinking or using drugs.    Avoid peers who try to pressure you into risky activities.    Learn skills to manage stress, anger and conflict.    Do not use or carry any kind of weapon.    Find a supportive person (teacher, parent, health provider, counselor) whom you can talk to when you feel sad, angry, lonely or like hurting yourself.    Find help if you are being abused physically or sexually, or if you fear being hurt by others.    As a teenager, you will be given more responsibility for your health and health care decisions.  While your parent  or guardian still has an important role, you will likely start spending some time alone with your health care provider as you get older.  Some teen health issues are actually considered confidential, and are protected by law.  Your health care team will discuss this and what it means with you.  Our goal is for you to become comfortable and confident caring for your own health.  ==============================================================   Attending

## 2019-10-30 NOTE — ED BEHAVIORAL HEALTH ASSESSMENT NOTE - DESCRIPTION
None. As per HPI Patient was calm and cooperative in the ED and did not exhibit any aggression. Patient did not require any PRN medications or any physical restraints.     Vital Signs Last 24 Hrs  T(C): 36.5 (30 Oct 2019 11:41), Max: 36.5 (30 Oct 2019 11:41)  T(F): 97.7 (30 Oct 2019 11:41), Max: 97.7 (30 Oct 2019 11:41)  HR: 62 (30 Oct 2019 11:41) (62 - 62)  BP: 122/76 (30 Oct 2019 11:41) (122/76 - 122/76)  BP(mean): --  RR: 17 (30 Oct 2019 11:41) (17 - 17)  SpO2: 100% (30 Oct 2019 11:41) (100% - 100%)

## 2019-10-30 NOTE — ED BEHAVIORAL HEALTH ASSESSMENT NOTE - DIFFERENTIAL
Major Depressive Disorder   Panic Disorder  Generalized Anxiety Disorder   Borderline Personality Disorder

## 2019-10-30 NOTE — ED BEHAVIORAL HEALTH ASSESSMENT NOTE - PRIMARY DX
Borderline personality disorder Severe episode of recurrent major depressive disorder, without psychotic features

## 2019-10-30 NOTE — ED BEHAVIORAL HEALTH ASSESSMENT NOTE - RISK ASSESSMENT
HIGH RISK     ACUTE RISK FACTORS: depressed mood, anhedonia, hopelessness and suicidal ideation, amotivation, anergia, poor appetite and sleep hygiene, difficulty functioning, poor self care, cutting behaviors.    CHRONIC RISK FACTORS: suicide attempt, self-injurious behaviors history, history of depression, panic and personality disorder.    PROTECTIVE FACTORS: no active suicidal ideation/intent/plan, supportive family, access to healthcare Low Acute Suicide Risk

## 2019-10-30 NOTE — ED ADULT TRIAGE NOTE - CHIEF COMPLAINT QUOTE
pt c/o worsening depression with suidical thoguhts. pt has hx of suicide attempt in past and is on medication for depression.

## 2019-10-30 NOTE — PATIENT PROFILE BEHAVIORAL HEALTH - NSBHPSYTREAT_PSY_A_CORE
long hx MDD, anxiety and BPD, treated by Dr Ball, 1 admit to [facility] Partial./private therapist

## 2019-10-30 NOTE — CONSULT NOTE ADULT - ASSESSMENT
Suicidal ideation 2/2 recurrent MDD severe episode / RONNY  plan per primary team: psychiatry  social work consult  supportive therapy  TSH 1.22  f/u A1c / lipids in am    Hypokalemia likely d/t decrease intake  no indication he was repleted  kdur 40meq x1  BMP in am    Superficial lacerations to thighs 2/2 self-cutting  bacitracin ointment bid    Celiac disease  stable  gluten-gliadin -lactose restricted diet    DVT PPX  low risk  IMPROVE score = 0  pt ambulatory on unit

## 2019-10-30 NOTE — ED ADULT NURSE NOTE - OBJECTIVE STATEMENT
23 y/o M presents to the ED c/o suicidal thoughts and depression. As per pt's father, pt's mother has had a debilitating stroke that has made patient depressed.  Recently patient has not had a plan but discussed SI with psychologist.

## 2019-10-30 NOTE — ED BEHAVIORAL HEALTH ASSESSMENT NOTE - SUMMARY
24 year-old  male, with history of Major Depressive Disorder without psychotic features, Generalized Anxiety Disorder, Agoraphobia with panic disorder, history of multiple psychotic trial, Borderline Personality Disorder, chronic passive suicidal ideation and chronic feelings of emptiness, two prior suicide attempt via overdose ~2014 but with no prior in-patient hospitalization, chronic self-injurious behaviors: cuts daily for past month, no abuse or trauma, no substance abuse, no legal history, presents referred by out-patient psychiatrist and brought in by father for worsening depression and suicidal ideation.    Patient presents with Major Depressive Disorder, recurrent, severe, with no psychosis. Patient has chronic passive suicidal ideation and recent active suicidal ideation and aborted suicide attempt. Patient unable to care for self in the setting of vegetative depressive symptoms. These symptoms represent a change from baseline from which the patient cannot be reasonably expected to improve with current level of care. The patient presents with risk requiring inpatient psychiatric hospitalization for safety and stabilization.

## 2019-10-31 LAB
ANION GAP SERPL CALC-SCNC: 3 MMOL/L — LOW (ref 5–17)
BUN SERPL-MCNC: 20 MG/DL — SIGNIFICANT CHANGE UP (ref 7–23)
CALCIUM SERPL-MCNC: 9.5 MG/DL — SIGNIFICANT CHANGE UP (ref 8.5–10.1)
CHLORIDE SERPL-SCNC: 105 MMOL/L — SIGNIFICANT CHANGE UP (ref 96–108)
CHOLEST SERPL-MCNC: 118 MG/DL — SIGNIFICANT CHANGE UP (ref 10–199)
CO2 SERPL-SCNC: 33 MMOL/L — HIGH (ref 22–31)
CREAT SERPL-MCNC: 1.14 MG/DL — SIGNIFICANT CHANGE UP (ref 0.5–1.3)
ESTIMATED AVERAGE GLUCOSE: 94 MG/DL — SIGNIFICANT CHANGE UP (ref 68–114)
GLUCOSE SERPL-MCNC: 85 MG/DL — SIGNIFICANT CHANGE UP (ref 70–99)
HBA1C BLD-MCNC: 4.9 % — SIGNIFICANT CHANGE UP (ref 4–5.6)
HDLC SERPL-MCNC: 34 MG/DL — LOW
LIPID PNL WITH DIRECT LDL SERPL: 74 MG/DL — SIGNIFICANT CHANGE UP
POTASSIUM SERPL-MCNC: 4.2 MMOL/L — SIGNIFICANT CHANGE UP (ref 3.5–5.3)
POTASSIUM SERPL-SCNC: 4.2 MMOL/L — SIGNIFICANT CHANGE UP (ref 3.5–5.3)
SODIUM SERPL-SCNC: 141 MMOL/L — SIGNIFICANT CHANGE UP (ref 135–145)
TOTAL CHOLESTEROL/HDL RATIO MEASUREMENT: 3.5 RATIO — SIGNIFICANT CHANGE UP (ref 3.4–9.6)
TRIGL SERPL-MCNC: 52 MG/DL — SIGNIFICANT CHANGE UP (ref 10–149)

## 2019-10-31 PROCEDURE — 99233 SBSQ HOSP IP/OBS HIGH 50: CPT

## 2019-10-31 RX ORDER — POTASSIUM CHLORIDE 20 MEQ
40 PACKET (EA) ORAL ONCE
Refills: 0 | Status: COMPLETED | OUTPATIENT
Start: 2019-10-31 | End: 2019-10-31

## 2019-10-31 RX ORDER — BACITRACIN ZINC 500 UNIT/G
1 OINTMENT IN PACKET (EA) TOPICAL
Refills: 0 | Status: DISCONTINUED | OUTPATIENT
Start: 2019-10-31 | End: 2019-11-04

## 2019-10-31 RX ADMIN — Medication 0.5 MILLIGRAM(S): at 09:19

## 2019-10-31 RX ADMIN — Medication 1 APPLICATION(S): at 21:38

## 2019-10-31 RX ADMIN — Medication 40 MILLIEQUIVALENT(S): at 09:19

## 2019-10-31 RX ADMIN — QUETIAPINE FUMARATE 100 MILLIGRAM(S): 200 TABLET, FILM COATED ORAL at 21:39

## 2019-10-31 RX ADMIN — SERTRALINE 100 MILLIGRAM(S): 25 TABLET, FILM COATED ORAL at 09:19

## 2019-10-31 RX ADMIN — Medication 0.5 MILLIGRAM(S): at 18:10

## 2019-10-31 RX ADMIN — Medication 1 APPLICATION(S): at 12:57

## 2019-10-31 NOTE — PROGRESS NOTE BEHAVIORAL HEALTH - NSBHADDHXSUBSTFT_PSY_A_CORE
Pt denied a h/o active substance use d/o. Pt denied IVDA . The pt did report a 1 yr h/o near daily THC use at age 20 . Pt reported 'an occasional beer  once or twice a month.   Pt did note a h/o Klonopin prescription for anxiety with pt h/o overuse of prescription more recently. Prescription was Klonopin 0.5 mg po tid and pt had exceeded this with 5-6 doses a day. Currently Klonopin 0.5 mg po bid with plan to taper and DC.

## 2019-10-31 NOTE — PROGRESS NOTE BEHAVIORAL HEALTH - NSBHADDHXPSYCHFT_PSY_A_CORE
Pt h/o prior partial hospital program attendance x 2 at [facility], once at age 20 and again  at age 23. Pt described these treatments as " helpful because they provided structure."

## 2019-10-31 NOTE — PROGRESS NOTE BEHAVIORAL HEALTH - NSBHFUPADDHPIFT_PSY_A_CORE
The pt is a 24 year-old single WM  with history of Major Depressive Disorder without psychotic features, Generalized Anxiety Disorder, Agoraphobia with panic disorder, history of multiple psychotic trial, Borderline Personality Disorder, childhood diagnosis of ADHD , and  chronic passive suicidal ideation  with chronic feelings of emptiness, two prior suicide attempt via overdose ~2014 but with no prior in-patient hospitalization, chronic self-injurious behaviors: cuts daily for past month, no abuse or trauma, no substance abuse, no legal history, presents referred by out-patient psychiatrist and brought in by father for worsening depression and suicidal ideation.    The pt was admitted to  inpatient psychiatric unit  for his first ever hospitalization  in the context of recent worsening neurovegetative  symptoms of depression with a recent intensification of + SI and SIB  via superficial cutting with a razor to bilateral  thighs  " to distract myself  from feeling suicidal."  In the ED on 10/30/19, the pt  presented with blunted affect, poor eye contact, complaining of depressed mood, severe, with anhedonia, anergia, amotivation, difficulty getting out of bed, and hopelessness. Endorsing helplessness, worthlessness. Reports poor appetite and poor sleep hygiene. Reports passive suicidal ideation denying current active suicidal ideation/intent/plan, stating last active suicidal ideation being ~10/15/2019 where he aborted driving to train station with plan of jumping in front of train, with barrier being thoughts of how his suicide attempt would affect family / friends. Reports this is still a barrier to suicide. Reports chronic anxiety, exacerbated in social settings, with panic. Reports his mood does fluctuate, gets happy, and periods of racing thoughts, about once weekly. Denies other periods of manic like symptoms. Denies psychotic symptoms. Denies substance abuse. Denies assault ideation/intent/plan and homicidal ideation/intent/plan.           Per ED evaluation,    Dr. Ball, out-patient psychiatrist, corroborates history, adding that patient chronic passive suicidal ideation however recent active suicidal ideation (did not state it was an aborted suicide attempt with plan and intent of jumping in front of train). Reports concerning for patient safety secondary to worsening depression and failed TMS. Patient has multiple failed trials of medications. Recommends ECT treatment.

## 2019-10-31 NOTE — PROGRESS NOTE BEHAVIORAL HEALTH - NSBHCHARTREVIEWVS_PSY_A_CORE FT
Vital Signs Last 24 Hrs  T(C): 36.5 (31 Oct 2019 07:29), Max: 36.5 (31 Oct 2019 07:29)  T(F): 97.7 (31 Oct 2019 07:29), Max: 97.7 (31 Oct 2019 07:29)  HR: --  BP: --  BP(mean): --  RR: 16 (31 Oct 2019 07:29) (14 - 16)  SpO2: 99% (31 Oct 2019 07:29) (99% - 100%)

## 2019-10-31 NOTE — PROGRESS NOTE BEHAVIORAL HEALTH - NSBHADMITIPBHPROVFT_PSY_A_CORE
Writer called and L/M on 10/31/19 for Dr Ball ( [phone]) Tel call on 10/31/19 and L/M with Dr Ball  9 pt's outpatient psychiatrist

## 2019-10-31 NOTE — PROGRESS NOTE BEHAVIORAL HEALTH - NSBHADDHXFAMFT_PSY_A_CORE
Pt lives with his parents in [facility] [place]. Pt has a 28 yr-old sister Ada who lives in [place] and teaches .  Sister with a h/o anxiety d/o.   Pt's mother with h/o HTN and s/p CVA  3 years ago with ongoing hemiplegia and speech difficulties. Pt has been helping her around the house and she  receives OT /PT/ speech services. Pt's father is healthy and works as an . Pt with 1 cat ( Edilma) and 2 dogs

## 2019-11-01 PROCEDURE — 99232 SBSQ HOSP IP/OBS MODERATE 35: CPT

## 2019-11-01 RX ADMIN — SERTRALINE 100 MILLIGRAM(S): 25 TABLET, FILM COATED ORAL at 11:02

## 2019-11-01 RX ADMIN — QUETIAPINE FUMARATE 100 MILLIGRAM(S): 200 TABLET, FILM COATED ORAL at 21:59

## 2019-11-01 RX ADMIN — Medication 0.5 MILLIGRAM(S): at 17:54

## 2019-11-01 RX ADMIN — Medication 0.5 MILLIGRAM(S): at 11:02

## 2019-11-01 NOTE — PROGRESS NOTE BEHAVIORAL HEALTH - NSBHFUPINTERVALHXFT_PSY_A_CORE
24 year-old  male, with history of Major Depressive Disorder without psychotic features, Generalized Anxiety Disorder, Agoraphobia with panic disorder, history of multiple psychotic trial, Borderline Personality Disorder, chronic passive suicidal ideation and chronic feelings of emptiness, two prior suicide attempt via overdose ~2014 but with no prior in-patient hospitalization, chronic self-injurious behaviors: cuts daily for past month, no abuse or trauma, no substance abuse, no legal history, presents referred by out-patient psychiatrist and brought in by father for worsening depression and suicidal ideation.    Patient presents with blunted affect, poor eye contact, complaining of depressed mood, severe, with anhedonia, anergia, amotivation, difficulty getting out of bed, and hopelessness. Endorsing helplessness, worthlessness. Reports poor appetite and poor sleep hygiene. Reports passive suicidal ideation denying current active suicidal ideation/intent/plan, stating last active suicidal ideation being ~10/15/2019 where he aborted driving to train station with plan of jumping in front of train, with barrier being thoughts of how his suicide attempt would affect family / friends. Reports this is still a barrier to suicide. Reports chronic anxiety, exacerbated in social settings, with panic. Reports his mood does fluctuate, gets happy, and periods of racing thoughts, about once weekly. Denies other periods of manic like symptoms. Denies psychotic symptoms. Denies substance abuse. Denies assault ideation/intent/plan and homicidal ideation/intent/plan.  The pt remains focussed on DC and continues to attempt to minimize his recently worsening h/o non suicidal SIB and SI  , the latter currently reportedly passive but still present and chronically waxing and waning.    Discussed with the pt the pt goal of decreasing the intensity and frequency of his SI rather than expecting a complete resolution of the pt's SI altogether. Principal diagnosis upon discharge:   Major Depressive d/o - recurrent, severe without psychotic features  24 year-old  male, with history of Major Depressive Disorder without psychotic features, Generalized Anxiety Disorder, Agoraphobia with panic disorder, history of multiple psychotic trial, Borderline Personality Disorder, chronic passive suicidal ideation and chronic feelings of emptiness, two prior suicide attempt via overdose ~2014 but with no prior in-patient hospitalization, chronic self-injurious behaviors: cuts daily for past month, no abuse or trauma, no substance abuse, no legal history, presents referred by out-patient psychiatrist and brought in by father for worsening depression and suicidal ideation.    Patient presents with blunted affect, poor eye contact, complaining of depressed mood, severe, with anhedonia, anergia, amotivation, difficulty getting out of bed, and hopelessness. Endorsing helplessness, worthlessness. Reports poor appetite and poor sleep hygiene. Reports passive suicidal ideation denying current active suicidal ideation/intent/plan, stating last active suicidal ideation being ~10/15/2019 where he aborted driving to train station with plan of jumping in front of train, with barrier being thoughts of how his suicide attempt would affect family / friends. Reports this is still a barrier to suicide. Reports chronic anxiety, exacerbated in social settings, with panic. Reports his mood does fluctuate, gets happy, and periods of racing thoughts, about once weekly. Denies other periods of manic like symptoms. Denies psychotic symptoms. Denies substance abuse. Denies assault ideation/intent/plan and homicidal ideation/intent/plan.  The pt remains focussed on DC and continues to attempt to minimize his recently worsening h/o non suicidal SIB and SI  , the latter currently reportedly passive but still present and chronically waxing and waning.    Discussed with the pt the pt goal of decreasing the intensity and frequency of his SI rather than expecting a complete resolution of the pt's SI altogether.    Course of Hospitalization   Lengthy meeting held  11/4/19 with the pt, his father and this writer to review pt clinical status and discharge planning. Psychoeducational information given to the pt and to pt's father related to Borderline personality d/o and the  hallmarks of affective dysregulation, nonsuicidal SIB , chronic feelings of emptiness  and fears of abandonment  all in the context of difficulties  remaining  'mindful' of the present   and the need to focus on the here and now rather than incessant worrying related to past and present expected failings of others on the pt's behalf.  Pt already describing his initial foray into the world of DBT and finding the coping strategies very helpful.             Discussed plan for the pt to continue inpatient with focus on DBT mindfulness  training pending an opening  at the [facility] where the pt and his father wish the pt to attend in earlier  consultation with the pt's  outpatient  Dr Ball . The pt's sleep and appetite are continuing to normalize. The pt reported slow steady clinical progress with his current med regimen of restarted Zoloft and newly added  Seroquel with ongoing though lower dose Klonopin for ongoing impairing anxiety with panic attacks.  Pt and his father are amenable to this pt aftercare treatment plan. An unexpected opening at the [facility] for 11/5/19 was conveyed to the pt and pt will now be discharged home to family with start date of 11/5/19 at [facility] as above.  Safety planning reviewed with the pt and with his father  and both are amenable to this plan for the pt's after care. The pt continues to deny SI /HI /AH /VH  and his mood has become more euthymic once again.

## 2019-11-01 NOTE — PROGRESS NOTE BEHAVIORAL HEALTH - DETAILS
superficial lacerations on legs Pristiq = 'panic attacks'  ( Actually, per outpatient doctor's report, pt with panic attacks prior to Pristiq trial)

## 2019-11-01 NOTE — PROGRESS NOTE BEHAVIORAL HEALTH - NSBHCHARTREVIEWVS_PSY_A_CORE FT
Vital Signs Last 24 Hrs  T(C): 36.7 (01 Nov 2019 09:03), Max: 36.7 (01 Nov 2019 09:03)  T(F): 98 (01 Nov 2019 09:03), Max: 98 (01 Nov 2019 09:03)  HR: --  BP: --  BP(mean): --  RR: 12 (01 Nov 2019 09:03) (12 - 12)  SpO2: 100% (01 Nov 2019 09:03) (100% - 100%)

## 2019-11-01 NOTE — PROGRESS NOTE BEHAVIORAL HEALTH - OTHER
" I feel fine now." pt with a h/o chronic waxing and waning passive SI without current plan reported

## 2019-11-02 PROCEDURE — 99232 SBSQ HOSP IP/OBS MODERATE 35: CPT

## 2019-11-02 RX ADMIN — SERTRALINE 100 MILLIGRAM(S): 25 TABLET, FILM COATED ORAL at 09:41

## 2019-11-02 RX ADMIN — Medication 1 APPLICATION(S): at 22:04

## 2019-11-02 RX ADMIN — Medication 0.5 MILLIGRAM(S): at 09:41

## 2019-11-02 RX ADMIN — QUETIAPINE FUMARATE 100 MILLIGRAM(S): 200 TABLET, FILM COATED ORAL at 22:01

## 2019-11-02 RX ADMIN — Medication 0.5 MILLIGRAM(S): at 22:01

## 2019-11-02 RX ADMIN — Medication 1 APPLICATION(S): at 09:40

## 2019-11-02 NOTE — PROGRESS NOTE BEHAVIORAL HEALTH - NSBHCHARTREVIEWVS_PSY_A_CORE FT
Vital Signs Last 24 Hrs  T(C): 36.7 (02 Nov 2019 08:16), Max: 36.7 (02 Nov 2019 08:16)  T(F): 98.1 (02 Nov 2019 08:16), Max: 98.1 (02 Nov 2019 08:16)  HR: --  BP: --  BP(mean): --  RR: 16 (02 Nov 2019 08:16) (16 - 16)  SpO2: 100% (02 Nov 2019 08:16) (100% - 100%)

## 2019-11-02 NOTE — PROGRESS NOTE BEHAVIORAL HEALTH - NSBHFUPINTERVALHXFT_PSY_A_CORE
24 year-old  male, with history of Major Depressive Disorder without psychotic features, Generalized Anxiety Disorder, Agoraphobia with panic disorder, history of multiple psychotic trial, Borderline Personality Disorder, chronic passive suicidal ideation and chronic feelings of emptiness, two prior suicide attempt via overdose ~2014 but with no prior in-patient hospitalization, chronic self-injurious behaviors: cuts daily for past month, no abuse or trauma, no substance abuse, no legal history, presents referred by out-patient psychiatrist and brought in by father for worsening depression and suicidal ideation.    Patient presents with blunted affect, poor eye contact, complaining of depressed mood, severe, with anhedonia, anergia, amotivation, difficulty getting out of bed, and hopelessness. Endorsing helplessness, worthlessness. Reports poor appetite and poor sleep hygiene. Reports passive suicidal ideation denying current active suicidal ideation/intent/plan, stating last active suicidal ideation being ~10/15/2019 where he aborted driving to train station with plan of jumping in front of train, with barrier being thoughts of how his suicide attempt would affect family / friends. Reports this is still a barrier to suicide. Reports chronic anxiety, exacerbated in social settings, with panic. Reports his mood does fluctuate, gets happy, and periods of racing thoughts, about once weekly. Denies other periods of manic like symptoms. Denies psychotic symptoms. Denies substance abuse. Denies assault ideation/intent/plan and homicidal ideation/intent/plan.  The pt remains focussed on DC and continues to     covering note  Pt appearing anxious and sad as he was expressing his hope that he would be able to go home.  He was trying his best to convey that he was "feeling better" He denied any suicidal ideation intent or plan and denied any side effects from medication        attempt to minimize his recently worsening h/o non suicidal SIB and SI  , the latter currently reportedly passive but still present and chronically waxing and waning.    Discussed with the pt the pt goal of decreasing the intensity and frequency of his SI rather than expecting a complete resolution of the pt's SI altogether.

## 2019-11-03 PROCEDURE — 99232 SBSQ HOSP IP/OBS MODERATE 35: CPT

## 2019-11-03 RX ADMIN — Medication 1 APPLICATION(S): at 08:57

## 2019-11-03 RX ADMIN — SERTRALINE 100 MILLIGRAM(S): 25 TABLET, FILM COATED ORAL at 08:57

## 2019-11-03 RX ADMIN — QUETIAPINE FUMARATE 100 MILLIGRAM(S): 200 TABLET, FILM COATED ORAL at 21:28

## 2019-11-03 RX ADMIN — Medication 0.5 MILLIGRAM(S): at 08:57

## 2019-11-03 RX ADMIN — Medication 0.5 MILLIGRAM(S): at 20:35

## 2019-11-03 NOTE — PROGRESS NOTE BEHAVIORAL HEALTH - NSBHFUPINTERVALHXFT_PSY_A_CORE
Pt with good eye contact . He is calm and denies any depression  or any suicidal ideation ,intent or plan.  He appears calm  and not distress.

## 2019-11-03 NOTE — DIETITIAN INITIAL EVALUATION ADULT. - PERTINENT MEDS FT
MEDICATIONS  (STANDING):  BACItracin   Ointment 1 Application(s) Topical two times a day  clonazePAM  Tablet 0.5 milliGRAM(s) Oral two times a day  QUEtiapine 100 milliGRAM(s) Oral at bedtime  sertraline 100 milliGRAM(s) Oral daily    MEDICATIONS  (PRN):  acetaminophen   Tablet .. 650 milliGRAM(s) Oral every 6 hours PRN Temp greater or equal to 38C (100.4F), Mild Pain (1 - 3), Moderate Pain (4 - 6)  aluminum hydroxide/magnesium hydroxide/simethicone Suspension 30 milliLiter(s) Oral every 6 hours PRN Dyspepsia  magnesium hydroxide Suspension 30 milliLiter(s) Oral daily PRN Constipation    Home Medications:  Abilify 2 mg oral tablet: 1 tab(s) orally once a day (at bedtime) (30 Aug 2018 06:25)  Ritalin 10 mg oral tablet: 1 tab(s) orally 2 times a day (30 Aug 2018 06:25)  Zoloft 100 mg oral tablet: 1 tab(s) orally once a day (at bedtime) (30 Aug 2018 06:25)  Zoloft 50 mg oral tablet: 1 tab(s) orally once a day (at bedtime) (30 Aug 2018 06:25)

## 2019-11-03 NOTE — DIETITIAN INITIAL EVALUATION ADULT. - OTHER INFO
· Interval History: 24 year-old  male, with history of Major Depressive Disorder without psychotic features, Generalized Anxiety Disorder, Agoraphobia with panic disorder, history of multiple psychotic trial, Borderline Personality Disorder, chronic passive suicidal ideation and chronic feelings of emptiness, two prior suicide attempt via overdose ~2014 but with no prior in-patient hospitalization, chronic self-injurious behaviors: cuts daily for past month, no abuse or trauma, no substance abuse, no legal history, presents referred by out-patient psychiatrist and brought in by father for worsening depression and suicidal ideation.  Pt had good eye contact on interview.  Reports he eats well, eats small meals all day, is athletic (ice hockey).  Pt familiar with gluten free diet, given refresher - oral, and given written information.  Pt is lactose intolerant.    Pt reports no wt loss over past 4 years, and reports meeting nutritional needs, but not gaining wt.  Pt on ritalin at home.

## 2019-11-03 NOTE — DIETITIAN INITIAL EVALUATION ADULT. - ENERGY NEEDS
Ht.  70    "        Wt.    56    kg               BMI    17.8              IBW   75    kg               Pt is at    %  IBW

## 2019-11-03 NOTE — CHART NOTE - NSCHARTNOTEFT_GEN_A_CORE
Upon Nutritional Assessment by the Registered Dietitian your patient was determined to meet criteria / has evidence of the following diagnosis/diagnoses:          [ ]  Mild Protein Calorie Malnutrition        [x ]  Moderate Protein Calorie Malnutrition        [ ] Severe Protein Calorie Malnutrition        [ ] Unspecified Protein Calorie Malnutrition        [ ] Underweight / BMI <19        [ ] Morbid Obesity / BMI > 40      Findings as based on:  •  Comprehensive nutrition assessment and consultation  •  Calorie counts (nutrient intake analysis)  •  Food acceptance and intake status from observations by staff  •  Follow up  •  Patient education  •  Intervention secondary to interdisciplinary rounds  •   concerns    Patient meets criteria for moderate protein-calorie malnutrition in context of chronic disease.    NFPE reveals moderate muscle wasting ( temples, shoulders, clavicles, scapula, interosseus, thighs, calves),   moderate fat wasting (triceps, ribs.)    Treatment:    The following diet has been recommended:  Maintain gluten free diet  add ensure enlive 8 oz bid  add gelatein bid  weekly weights  Encourage PO intake.  Record PO intake in EMR after each meal (nursing.)   Monitor PO intake, tolerance, labs and weight.       PROVIDER Section:     By signing this assessment you are acknowledging and agree with the diagnosis/diagnoses assigned by the Registered Dietitian    Comments:

## 2019-11-03 NOTE — PROGRESS NOTE BEHAVIORAL HEALTH - NSBHCHARTREVIEWVS_PSY_A_CORE FT
Vital Signs Last 24 Hrs  T(C): 36.9 (03 Nov 2019 07:42), Max: 36.9 (03 Nov 2019 07:42)  T(F): 98.4 (03 Nov 2019 07:42), Max: 98.4 (03 Nov 2019 07:42)  HR: --  BP: --  BP(mean): --  RR: 16 (03 Nov 2019 07:42) (16 - 16)  SpO2: 100% (03 Nov 2019 07:42) (100% - 100%)

## 2019-11-03 NOTE — DIETITIAN INITIAL EVALUATION ADULT. - MALNUTRITION
Patient meets criteria for moderate protein-calorie malnutrition in context of chronic disease.  NFPE reveals moderate muscle wasting ( temples, shoulders, clavicles, scapula, interosseus, thighs, calves), moderate fat wasting (temples, ribs.) Patient meets criteria for moderate protein-calorie malnutrition in context of chronic disease

## 2019-11-04 VITALS — TEMPERATURE: 98 F | OXYGEN SATURATION: 100 % | RESPIRATION RATE: 12 BRPM

## 2019-11-04 PROCEDURE — 99232 SBSQ HOSP IP/OBS MODERATE 35: CPT

## 2019-11-04 RX ORDER — SERTRALINE 25 MG/1
1 TABLET, FILM COATED ORAL
Qty: 0 | Refills: 0 | DISCHARGE

## 2019-11-04 RX ORDER — CLONAZEPAM 1 MG
1 TABLET ORAL
Qty: 28 | Refills: 0
Start: 2019-11-04 | End: 2019-11-17

## 2019-11-04 RX ORDER — BACITRACIN ZINC 500 UNIT/G
1 OINTMENT IN PACKET (EA) TOPICAL
Qty: 0 | Refills: 0 | DISCHARGE
Start: 2019-11-04

## 2019-11-04 RX ORDER — METHYLPHENIDATE HCL 5 MG
1 TABLET ORAL
Qty: 0 | Refills: 0 | DISCHARGE

## 2019-11-04 RX ORDER — ARIPIPRAZOLE 15 MG/1
1 TABLET ORAL
Qty: 0 | Refills: 0 | DISCHARGE

## 2019-11-04 RX ORDER — QUETIAPINE FUMARATE 200 MG/1
1 TABLET, FILM COATED ORAL
Qty: 14 | Refills: 1
Start: 2019-11-04 | End: 2019-12-01

## 2019-11-04 RX ORDER — SERTRALINE 25 MG/1
1 TABLET, FILM COATED ORAL
Qty: 14 | Refills: 1
Start: 2019-11-04 | End: 2019-12-01

## 2019-11-04 RX ADMIN — Medication 1 APPLICATION(S): at 09:24

## 2019-11-04 RX ADMIN — Medication 0.5 MILLIGRAM(S): at 09:24

## 2019-11-04 RX ADMIN — SERTRALINE 100 MILLIGRAM(S): 25 TABLET, FILM COATED ORAL at 09:24

## 2019-11-04 NOTE — DISCHARGE NOTE BEHAVIORAL HEALTH - MEDICATION SUMMARY - MEDICATIONS TO TAKE
I will START or STAY ON the medications listed below when I get home from the hospital:    clonazePAM 0.5 mg oral tablet  -- 1 tab(s) by mouth 2 times a day MDD:MDD = 1  mg /day  -- Indication: For Agoraphobia with panic disorder    sertraline 100 mg oral tablet  -- 1 tab(s) by mouth once a day  -- Indication: For Severe episode of recurrent major depressive disorder, without psychotic features    QUEtiapine 100 mg oral tablet  -- 1 tab(s) by mouth once a day (at bedtime)  -- Indication: For Severe episode of recurrent major depressive disorder, without psychotic features    bacitracin 500 units/g topical ointment  -- 1 application on skin 2 times a day  -- Indication: For SIB superficial cutting to bilateral  thighs

## 2019-11-04 NOTE — DISCHARGE NOTE BEHAVIORAL HEALTH - NSBHDCTESTSFT_PSY_A_CORE
TSH= 1.22  HgA1C = 4.9  Fasting lipids  Cholesterol = 118  TG=52  K+=3.3 ( upon admission )  ( K= 4.2 )  rechecked after  [facility])  EKG  Sinus bradycardia  VR= 48 ( on admission)   No studies are pending

## 2019-11-04 NOTE — DISCHARGE NOTE BEHAVIORAL HEALTH - MEDICATION SUMMARY - MEDICATIONS TO STOP TAKING
I will STOP taking the medications listed below when I get home from the hospital:    Zoloft 50 mg oral tablet  -- 1 tab(s) by mouth once a day (at bedtime)    Ritalin 10 mg oral tablet  -- 1 tab(s) by mouth 2 times a day    Abilify 2 mg oral tablet  -- 1 tab(s) by mouth once a day (at bedtime)    oxyCODONE-acetaminophen 5 mg-325 mg oral tablet  -- 1 tab(s) by mouth every 8 hours, As Needed  -for severe pain MDD:4-6  -- Caution federal law prohibits the transfer of this drug to any person other  than the person for whom it was prescribed.  May cause drowsiness.  Alcohol may intensify this effect.  Use care when operating dangerous machinery.  This prescription cannot be refilled.  This product contains acetaminophen.  Do not use  with any other product containing acetaminophen to prevent possible liver damage.  Using more of this medication than prescribed may cause serious breathing problems.

## 2019-11-04 NOTE — DISCHARGE NOTE BEHAVIORAL HEALTH - NSBHDCLABSFT_PSY_A_CORE
as per pt's PCP    recheck HgA1C, Fasting lipids q 6 months with SGA Seroquel  monthly monitoring of vital signs, waist circumference and weight  with SGA Seroquel and with pt celiac disease

## 2019-11-04 NOTE — PROGRESS NOTE BEHAVIORAL HEALTH - NSBHADMITMEDEDUDETAILS_A_CORE FT
Pt familiar with current med regimen. Psychoeducation and informed consent obtained from the pt for a retrial of previously effective Zoloft and newly begun low dose Seroquel for mood stabilization

## 2019-11-04 NOTE — DISCHARGE NOTE BEHAVIORAL HEALTH - NSBHDCMEDICALFT_PSY_A_CORE
celiac disease  gluten enteropathy  lactose intolerance  remote asthma  moderate protein calorie malnutrition

## 2019-11-04 NOTE — PROGRESS NOTE BEHAVIORAL HEALTH - NSBHFUPINTERVALCCFT_PSY_A_CORE
" I think I am feeling  much better now and I talked to my father about a safety plan for when I feel suicidal again."                       PLEASE NOTE ( 11/1/19)    On  11/1/19  Writer had  a lengthy discussion with the pt and his father on 11/1/19 and later multiple discussions with the pt alone all with  pt focus on DC home.   Writer also had a lengthy phone discussion with the pt's most recent outpatient psychiatrist Dr Ball who had recently employed a pt trial of TMS  to treat the pt's depression. With pt comorbid disorders including borderline personality d/o with its attendant severe affective dysregulation and mood lability, the TMS course was not successful and  Dr Ball began pt. on a retrial of his previously effective Zoloft course prior to his admission to hospital ( 1st inpatient admission ) for worsening severe MDD with SI.  Due to the pt's his=gh risk of self harm via non suicidal SIB and  recent increased SI prior to admission, the plan is to titrate pt's current med regimen   while awaiting pt referral directly to [facility] at [facility] for ongoing intense treatment. In the meantime, staff have been advised of the pt's diagnostic challenges and all are working with the pt as to DBT / CBT mindfulness therapy, an evidence based effective treatment for pt Borderline PD.
" I'm feeling much better now. I've been using the techniques of DBT and mindfulness already and it helps me to stay focussed in the moment."
"I'm doing much better and I would like to go home"
"I'm going home right?"
" I have been more depressed . I just need something to distract me from the  suicidal thoughts. That's why I started cutting myself again. "

## 2019-11-04 NOTE — DISCHARGE NOTE BEHAVIORAL HEALTH - HPI (INCLUDE ILLNESS QUALITY, SEVERITY, DURATION, TIMING, CONTEXT, MODIFYING FACTORS, ASSOCIATED SIGNS AND SYMPTOMS)
24 year-old  male, with history of Major Depressive Disorder without psychotic features, Generalized Anxiety Disorder, Agoraphobia with panic disorder, history of multiple psychotic trial, Borderline Personality Disorder, chronic passive suicidal ideation and chronic feelings of emptiness, two prior suicide attempt via overdose ~2014 but with no prior in-patient hospitalization, chronic self-injurious behaviors: cuts daily for past month, no abuse or trauma, no substance abuse, no legal history, presents referred by out-patient psychiatrist and brought in by father for worsening depression and suicidal ideation.    Patient presents with blunted affect, poor eye contact, complaining of depressed mood, severe, with anhedonia, anergia, amotivation, difficulty getting out of bed, and hopelessness. Endorsing helplessness, worthlessness. Reports poor appetite and poor sleep hygiene. Reports passive suicidal ideation denying current active suicidal ideation/intent/plan, stating last active suicidal ideation being ~10/15/2019 where he aborted driving to train station with plan of jumping in front of train, with barrier being thoughts of how his suicide attempt would affect family / friends. Reports this is still a barrier to suicide. Reports chronic anxiety, exacerbated in social settings, with panic. Reports his mood does fluctuate, gets happy, and periods of racing thoughts, about once weekly. Denies other periods of manic like symptoms. Denies psychotic symptoms. Denies substance abuse. Denies assault ideation/intent/plan and homicidal ideation/intent/plan. Principal diagnosis upon discharge :  Major depressive d/o -recurrent, severe without psychotic features   Per ED evaluation of 10/30/19:                  The pt is a 24 year-old  male, with history of Major Depressive Disorder without psychotic features, Generalized Anxiety Disorder, Agoraphobia with panic disorder, history of multiple antidepressant   medication trials  Borderline Personality Disorder, chronic passive suicidal ideation and chronic feelings of emptiness, two prior suicide attempt via overdose ~2014 but with no prior in-patient hospitalization, chronic self-injurious behaviors: cuts daily for past month, no abuse or trauma, no substance abuse, no legal history, presents referred by out-patient psychiatrist and brought in by father for worsening depression and suicidal ideation.   The pt made a speedy flight into health in the context of his primary Borderline personality d/o with largely resolved affective dysregulation  and feelings of emptiness and abandonment as the pt's family  quickly rallied around the pt providing much needed pt moral support.  We discussed the pt treatment goal  as best exemplified by the goal of decreasing  the intensity and frequency of his SI rather than expecting a complete resolution of the pt's SI altogether.                                                                Course of Hospitalization          		 Lengthy meeting held  11/4/19 with the pt, his father and this writer to review pt clinical status and discharge planning. Psychoeducational information given to the pt and to pt's father related to Borderline personality d/o and the  hallmarks of affective dysregulation, nonsuicidal SIB , chronic feelings of emptiness  and fears of abandonment  all in the context of difficulties  remaining  'mindful' of the present   and the need to focus on the here and now rather than incessant worrying related to past and present expected failings of others on the pt's behalf.  Pt already describing his initial foray into the world of DBT and finding the coping strategies very helpful.             Discussed plan for the pt to continue inpatient with focus on DBT mindfulness  training pending an opening  at the [facility] at [facility] where the pt and his father wish the pt to attend in earlier  consultation with the pt's  outpatient  Dr Ball . The pt's sleep and appetite are continuing to normalize. The pt reported slow steady clinical progress with his current med regimen of restarted Zoloft and newly added  Seroquel with ongoing though lower dose Klonopin for ongoing impairing anxiety with panic attacks.  Pt and his father are amenable to this pt aftercare treatment plan. An unexpected opening at the [facility] for 11/5/19 was conveyed to the pt and pt will now be discharged home to family with start date of 11/5/19 at [facility] as above.  Safety planning reviewed with the pt and with his father  and both are amenable to this plan for the pt's after care. The pt continues to deny SI /HI /AH /VH  and his mood has become more euthymic once again. The pt is amenable to ongoing outpatient treatment at [facility] and later individually with ongoing pt work on DBT mindfulness treatment to further decrease the intensity of his chronic intermittent, waxing and waning SI  as a key feature related to the pt's borderline personality d/o.

## 2019-11-04 NOTE — DISCHARGE NOTE BEHAVIORAL HEALTH - NSBHDCTHERAPYFT_PSY_A_CORE
Psychosocial Assessment  Individual, Group and Milieu Therapies  Psychoeducation related to his diagnoses and treatment  Family Involvement  Discharge planning

## 2019-11-04 NOTE — DISCHARGE NOTE BEHAVIORAL HEALTH - CONDITIONS AT DISCHARGE
Patient was seen and evaluated by treatment team and is discharged.  He is alert, ambulatory; no distress noted nor voiced by patient; he denies current suicidal/homicidal ideation, and denies auditory/visual hallucination.  Discharge and follow-up instructions explained and given to him and he verbalized understanding.  All belongings returned to him and he is dressed appropriately for d/c.  Patient left the unit, accompanied by family member.

## 2019-11-04 NOTE — PROGRESS NOTE BEHAVIORAL HEALTH - NSBHLEGALSTATUS_PSY_A_CORE
recent worsening SI with plan to jump in front of a train/9.39 (Emergency)
9.39 (Emergency)/recent worsening SI with plan to jump in front of a train
recent worsening SI with plan to jump in front of a train/9.39 (Emergency)
9.39 (Emergency)/recent worsening SI with plan to jump in front of a train
9.39 (Emergency)/recent worsening SI with plan to jump in front of a train

## 2019-11-04 NOTE — DISCHARGE NOTE BEHAVIORAL HEALTH - NSBHDCSUICSAFETYFT_PSY_A_CORE
1. Safety planning reviewed daily with the pt  2.Pt can contact this writer at this hospital prior to his PHP start date on 11/5/19 for any questions/ concerns  3.Pt to have access to 24/7 Crisis Hotline to call  for any future safety concerns if they should arise  4.Pt and family aware that the pt can always return to the nearest hospital ER 24/7 for emergency reevaluation should new safety concerns arise

## 2019-11-04 NOTE — PROGRESS NOTE BEHAVIORAL HEALTH - PROBLEM SELECTOR PROBLEM 1
Severe episode of recurrent major depressive disorder, without psychotic features

## 2019-11-04 NOTE — PROGRESS NOTE BEHAVIORAL HEALTH - NS ED BHA MSE GENERAL APPEARANCE
No deformities present/Well developed
No deformities present/Well developed
Well developed/No deformities present

## 2019-11-04 NOTE — PROGRESS NOTE BEHAVIORAL HEALTH - NSBHPTASSESSDT_PSY_A_CORE
01-Nov-2019
02-Nov-2019 21:29
03-Nov-2019 13:03
04-Nov-2019
Was the patient seen in the last year in this department? Yes    Does patient have an active prescription for medications requested? No     Received Request Via: Pharmacy  
31-Oct-2019

## 2019-11-04 NOTE — PROGRESS NOTE BEHAVIORAL HEALTH - PROBLEM SELECTOR PLAN 1
1. Restart Zoloft now increased to 100 mg po q am  2.Begin low dose Seroquel 50 mg po qhs ( affective dysregulation)  3Pt encouraged to attend therapy groups as tolerated  4. Pt gave writer permission to speak with pt's parents and with outpatient . Dr Ball to gather more pt clinical history to aid in diagnosis and treatment planning  5. Disposition planning ongoing
1. Restart Zoloft now increased to 100 mg po q am  2.Continue  Seroquel increased to 100  mg po qhs ( affective dysregulation)  3Pt encouraged to attend therapy groups as tolerated  4. Pt gave writer permission to speak with pt's parents and with outpatient . Dr Ball to gather more pt clinical history to aid in diagnosis and treatment planning  5. Disposition planning ongoing with pt for DC home 11/4/19 now that opening present for [facility] to begin on 11/5/19
1. Restart Zoloft now increased to 100 mg po q am  2.Continue  Seroquel increased to 100  mg po qhs ( affective dysregulation)  3Pt encouraged to attend therapy groups as tolerated  4. Pt gave writer permission to speak with pt's parents and with outpatient . Dr Ball to gather more pt clinical history to aid in diagnosis and treatment planning  5. Disposition planning ongoing

## 2019-11-04 NOTE — DISCHARGE NOTE BEHAVIORAL HEALTH - NSBHDCPURPOSE1FT_PSY_A_CORE
Patient has an appt. to begin o/p psychiatric treatment at [facility] on Tuesday, 11/5/2019 at 8:30AM. He does not need transportation, he is able to drive himself.

## 2019-11-04 NOTE — PROGRESS NOTE BEHAVIORAL HEALTH - PROBLEM SELECTOR PROBLEM 3
Agoraphobia with panic disorder

## 2019-11-04 NOTE — DISCHARGE NOTE BEHAVIORAL HEALTH - NSBHDCMEDSFT_PSY_A_CORE
Discharge Medications  BACItracin   Ointment 1 Application(s) Topical two times a day  clonazePAM  Tablet 0.5 milliGRAM(s) Oral two times a day  QUEtiapine 100 milliGRAM(s) Oral at bedtime  sertraline 100 milliGRAM(s) Oral daily    The patient has been educated to continue the medications until told by his outpatient doctor to stop

## 2019-11-04 NOTE — PROGRESS NOTE BEHAVIORAL HEALTH - PROBLEM SELECTOR PLAN 2
1.Klonopin lowered to 0.5 mg po bid with plan to continue dose due to severity of the pt's anxiety with later taper and DC   2.Pt urged to attend therapy groups as tolerated  3.Ongoing staff support
1.Klonopin lowered to 0.5 mg po bid with plan to taper and DC  2.Pt urged to attend therapy groups as tolerated  3.Ongoing staff support

## 2019-11-04 NOTE — PROGRESS NOTE BEHAVIORAL HEALTH - BODY HABITUS
Well nourished/Underweight

## 2019-11-04 NOTE — PROGRESS NOTE BEHAVIORAL HEALTH - AXIS III
celiac disease  gluten enteropathy

## 2019-11-04 NOTE — PROGRESS NOTE BEHAVIORAL HEALTH - RISK ASSESSMENT
HIGH RISK     ACUTE RISK FACTORS: depressed mood, anhedonia, hopelessness and suicidal ideation, amotivation, anergia, poor appetite and sleep hygiene, difficulty functioning, poor self care, cutting behaviors.    CHRONIC RISK FACTORS: suicide attempt, self-injurious behaviors history, history of depression, panic and personality disorder.    PROTECTIVE FACTORS: no active suicidal ideation/intent/plan, supportive family, access to healthcare
HIGH RISK    characteristic flight into health with longstanding  pervasive borderline PD  with intensive pt work on DBT and mindfulness training  as evidence based treatments for this personality d/o    ACUTE RISK FACTORS: depressed mood, anhedonia, hopelessness and suicidal ideation, amotivation, anergia, poor appetite and sleep hygiene, difficulty functioning, poor self care, cutting behaviors.    CHRONIC RISK FACTORS: suicide attempt, self-injurious behaviors history, history of depression, panic and personality disorder.    PROTECTIVE FACTORS: no active suicidal ideation/intent/plan, supportive family, access to healthcare
Moderate RISK     ACUTE RISK FACTORS: Pt claiming a decrease in depressed mood, anhedonia, hopelessness and suicidal ideation, amotivation, anergia, poor appetite and sleep hygiene, difficulty functioning, poor self care, cutting behaviors.    CHRONIC RISK FACTORS: suicide attempt, self-injurious behaviors history, history of depression, panic and personality disorder.    PROTECTIVE FACTORS: no active suicidal ideation/intent/plan, supportive family, access to healthcare
Moderate RISK     ACUTE RISK FACTORS: Pt claiming a decrease in depressed mood, anhedonia, hopelessness and suicidal ideation, amotivation, anergia, poor appetite and sleep hygiene, difficulty functioning, poor self care, cutting behaviors.    CHRONIC RISK FACTORS: suicide attempt, self-injurious behaviors history, history of depression, panic and personality disorder.    PROTECTIVE FACTORS: no active suicidal ideation/intent/plan, supportive family, access to healthcare
HIGH RISK     ACUTE RISK FACTORS: depressed mood, anhedonia, hopelessness and suicidal ideation, amotivation, anergia, poor appetite and sleep hygiene, difficulty functioning, poor self care, cutting behaviors.    CHRONIC RISK FACTORS: suicide attempt, self-injurious behaviors history, history of depression, panic and personality disorder.    PROTECTIVE FACTORS: no active suicidal ideation/intent/plan, supportive family, access to healthcare

## 2019-11-04 NOTE — PROGRESS NOTE BEHAVIORAL HEALTH - NSBHFUPINTERVALHXFT_PSY_A_CORE
Pt seen  with pt's father for a lengthy clinical pt update and clinical review of borderline personality d/o and after care treatment for the pt to include [facility] at [facility].    Discussed with the pt the pt goal of decreasing the intensity and frequency of his SI rather than sleep and appetite are continuing to normalize. The pt reported slow steady clinical progress with his current med regimen of restarted Zoloft and newly added  Seroquel with ongoing though lower dose Klonopin for ongoing impairing anxiety with panic attacks.  Pt and his father are amenable to this pt aftercare treatment plan. An unexpected opening at the [facility] for 11/5/19 was conveyed to the pt and pt will now be discharged home to family with start date of 11/5/19 at [facility] as above.  Safety planning reviewed with the pt and with his father  and both are amenable to this plan for the pt's after care. The pt continues to deny SI /HI /AH /VH  and his mood has become more euthymic once again.

## 2019-11-04 NOTE — PROGRESS NOTE BEHAVIORAL HEALTH - NSBHADMITIPOBSFT_PSY_A_CORE
pt with intermittent passive SI with no plan

## 2019-11-04 NOTE — PROGRESS NOTE BEHAVIORAL HEALTH - PROBLEM SELECTOR PROBLEM 2
RONNY (generalized anxiety disorder)

## 2019-11-04 NOTE — DISCHARGE NOTE BEHAVIORAL HEALTH - NSBHDCCRISISPLAN1FT_PSY_A_CORE
Talk to a trusted family member, friend, counselor and ask for help.  Call the Suicide Hotline at 1-761.239.1680  Call 911 or go to my nearest hospital emergency room.

## 2019-11-04 NOTE — DISCHARGE NOTE BEHAVIORAL HEALTH - NSBHDCSWCOMMENTSFT_PSY_A_CORE
Patient educated about the signs and symptoms of Depression and Borderline Personality Disorder, the need and resources for continuing in the appropriate level of psychiatric outpatient treatment, and the need to continue taking the above medications as prescribed unless directed otherwise by his outpatient provider.

## 2019-11-04 NOTE — DISCHARGE NOTE BEHAVIORAL HEALTH - NSBHDCSUICFCTRMIT_PSY_A_CORE
Pt has strong supportive family  pt with [facility] staff at [place] and with strong support from outpatient treatment team

## 2019-11-04 NOTE — PROGRESS NOTE BEHAVIORAL HEALTH - NSBHCHARTREVIEWINVESTIGATE_PSY_A_CORE FT
EKG   Sinus bradycardia  VR= 48   QT /QTc = 412 /368   NS T Wave    likely due to early repolarization

## 2019-11-04 NOTE — PROGRESS NOTE BEHAVIORAL HEALTH - NSBHCHARTREVIEWLAB_PSY_A_CORE FT
CBC Full  -  ( 30 Oct 2019 12:17 )  WBC Count : 6.59 K/uL  RBC Count : 5.21 M/uL  Hemoglobin : 15.3 g/dL  Hematocrit : 46.1 %  Platelet Count - Automated : 179 K/uL  Mean Cell Volume : 88.5 fl  Mean Cell Hemoglobin : 29.4 pg  Mean Cell Hemoglobin Concentration : 33.2 gm/dL  Auto Neutrophil # : 4.11 K/uL  Auto Lymphocyte # : 1.81 K/uL  Auto Monocyte # : 0.49 K/uL  Auto Eosinophil # : 0.14 K/uL  Auto Basophil # : 0.02 K/uL  Auto Neutrophil % : 62.4 %  Auto Lymphocyte % : 27.5 %  Auto Monocyte % : 7.4 %  Auto Eosinophil % : 2.1 %  Auto Basophil % : 0.3 %    10-31    141  |  105  |  20  ----------------------------<  85  4.2   |  33<H>  |  1.14    Ca    9.5      31 Oct 2019 09:34    TPro  7.4  /  Alb  4.3  /  TBili  0.5  /  DBili  x   /  AST  12<L>  /  ALT  22  /  AlkPhos  68  10-30

## 2019-11-04 NOTE — DISCHARGE NOTE BEHAVIORAL HEALTH - SECONDARY DIAGNOSIS.
RONNY (generalized anxiety disorder) Agoraphobia with panic disorder Borderline personality disorder

## 2019-11-04 NOTE — PROGRESS NOTE BEHAVIORAL HEALTH - NSBHCHARTREVIEWIMAGING_PSY_A_CORE FT
MEDICATIONS  (STANDING):  BACItracin   Ointment 1 Application(s) Topical two times a day  clonazePAM  Tablet 0.5 milliGRAM(s) Oral two times a day  QUEtiapine 100 milliGRAM(s) Oral at bedtime  sertraline 100 milliGRAM(s) Oral daily    MEDICATIONS  (PRN):  acetaminophen   Tablet .. 650 milliGRAM(s) Oral every 6 hours PRN Temp greater or equal to 38C (100.4F), Mild Pain (1 - 3), Moderate Pain (4 - 6)  aluminum hydroxide/magnesium hydroxide/simethicone Suspension 30 milliLiter(s) Oral every 6 hours PRN Dyspepsia  magnesium hydroxide Suspension 30 milliLiter(s) Oral daily PRN Constipation

## 2019-11-04 NOTE — DISCHARGE NOTE BEHAVIORAL HEALTH - PAST PSYCHIATRIC HISTORY
This is the first psychiatric admission for this patient. He has been treated for Depression at a [facility] in the past and most recently he has been in outpatient treatment with Dr. Ball  who treated him with TMS.

## 2019-11-04 NOTE — PROGRESS NOTE BEHAVIORAL HEALTH - PROBLEM SELECTOR PROBLEM 4
Borderline personality disorder

## 2019-11-04 NOTE — DISCHARGE NOTE BEHAVIORAL HEALTH - NSBHDCDXVALIDYESFT_PSY_A_CORE
Major depressive d/o - recurrent, w/o psychotic features  RONNY  Borderline personality d/o  Agoraphobia with panic attacks

## 2019-11-04 NOTE — PROGRESS NOTE BEHAVIORAL HEALTH - NSBHLOC_PSY_A_CORE
He had labs from work    They show  Gluc 104  And really high lipids  Trigs are 338  Low hdl at 30  ldl is 58    C/w metabolic syndrome    Needs low carb diet please notify
See results below. Attempted to contact patient regarding lab results. Unable to reach, left message to return call to discuss results.
Time started: 0912    Time ended: 0914    Total time spent on chart: 1:29    Patient informed of the results. He will try and follow a low carb diet.
Alert

## 2019-11-04 NOTE — PROGRESS NOTE BEHAVIORAL HEALTH - PROBLEM SELECTOR PLAN 3
1. Klonopin 0.5 mg po bid with plan to continue lowered dose for now  due to severity of pt's anxiety and with  plan to  DC later on  due to pt h/o overuse  2.Ongoing relaxation therapy and CBT / DBT coping skills 'homework/'
1. Klonopin 0.5 mg po bid with plan to DC due to pt h/o overuse  2.Ongoing relaxation therapy and CBT / DBT coping skills 'homework/'

## 2019-11-04 NOTE — DISCHARGE NOTE BEHAVIORAL HEALTH - PROVIDER TOKENS
FREE:[LAST:[tba],FIRST:[tba],PHONE:[(   )    -],FAX:[(   )    -],ADDRESS:[Pt has intake for entry into [facility] program  on 11/5/19 at 8:30 am]]

## 2019-11-04 NOTE — DISCHARGE NOTE BEHAVIORAL HEALTH - NSBHDCADDFT_PSY_A_CORE
Treatment goals  1.Depression- goal currently achieved in the context of affective dysregulation as part of borderline personality d/o. Pt tolerating med regimen  without side effects and with early initial clinical efficacy. .Pt continues to deny SI /HI /AH /VH  2.Borderline PD- goal with early gains due to ongoing DBT / CBT and mindfulness teaching

## 2019-11-04 NOTE — PROGRESS NOTE BEHAVIORAL HEALTH - SECONDARY DX1
RONNY (generalized anxiety disorder)
RONYN (generalized anxiety disorder)

## 2019-11-04 NOTE — PROGRESS NOTE BEHAVIORAL HEALTH - THOUGHT CONTENT
Suicidality/Preoccupations/Guilt/Ruminations/Hopelessness
Guilt/Preoccupations
Other/Preoccupations
Preoccupations/Guilt
Preoccupations/Ruminations/Guilt/Other/Hopelessness/Suicidality

## 2019-11-04 NOTE — PROGRESS NOTE BEHAVIORAL HEALTH - PROBLEM SELECTOR PLAN 4
1. DBT /CBT and mindfulness training  2.Therapy group attendance  as tolerated   3.Ongoing staff support and encouragement.

## 2019-11-04 NOTE — DISCHARGE NOTE BEHAVIORAL HEALTH - CARE PROVIDER_API CALL
wali keyes  Pt has intake for entry into [facility] program  on 11/5/19 at 8:30 am  Phone: (   )    -  Fax: (   )    -  Follow Up Time:

## 2019-11-04 NOTE — PROGRESS NOTE BEHAVIORAL HEALTH - NSBHCHARTREVIEWVS_PSY_A_CORE FT
Vital Signs Last 24 Hrs  T(C): 36.7 (04 Nov 2019 08:41), Max: 36.7 (04 Nov 2019 08:41)  T(F): 98.1 (04 Nov 2019 08:41), Max: 98.1 (04 Nov 2019 08:41)  HR: --  BP: --  BP(mean): --  RR: 12 (04 Nov 2019 08:41) (12 - 12)  SpO2: 100% (04 Nov 2019 08:41) (100% - 100%)

## 2019-11-04 NOTE — DISCHARGE NOTE BEHAVIORAL HEALTH - FAMILY HISTORY OF PSYCHIATRIC ILLNESS
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 He is a High School graduate currently enrolled in an [employer]. He is supported  and insured by his family. There is no family history of mental illness or addiction.

## 2019-11-07 DIAGNOSIS — F33.2 MAJOR DEPRESSIVE DISORDER, RECURRENT SEVERE WITHOUT PSYCHOTIC FEATURES: ICD-10-CM

## 2019-11-07 DIAGNOSIS — F60.3 BORDERLINE PERSONALITY DISORDER: ICD-10-CM

## 2019-11-07 DIAGNOSIS — S71.111A LACERATION WITHOUT FOREIGN BODY, RIGHT THIGH, INITIAL ENCOUNTER: ICD-10-CM

## 2019-11-07 DIAGNOSIS — Z91.5 PERSONAL HISTORY OF SELF-HARM: ICD-10-CM
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
## 2019-11-07 DIAGNOSIS — E73.9 LACTOSE INTOLERANCE, UNSPECIFIED: ICD-10-CM

## 2019-11-07 DIAGNOSIS — R45.851 SUICIDAL IDEATIONS: ICD-10-CM

## 2019-11-07 DIAGNOSIS — E87.6 HYPOKALEMIA: ICD-10-CM

## 2019-11-07 DIAGNOSIS — F41.1 GENERALIZED ANXIETY DISORDER: ICD-10-CM
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
## 2019-11-07 DIAGNOSIS — S71.112A LACERATION WITHOUT FOREIGN BODY, LEFT THIGH, INITIAL ENCOUNTER: ICD-10-CM

## 2019-11-07 DIAGNOSIS — F40.01 AGORAPHOBIA WITH PANIC DISORDER: ICD-10-CM

## 2019-11-07 DIAGNOSIS — E44.0 MODERATE PROTEIN-CALORIE MALNUTRITION: ICD-10-CM

## 2019-11-07 DIAGNOSIS — K90.0 CELIAC DISEASE: ICD-10-CM
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
## 2019-11-07 DIAGNOSIS — F17.210 NICOTINE DEPENDENCE, CIGARETTES, UNCOMPLICATED: ICD-10-CM

## 2020-02-25 ENCOUNTER — EMERGENCY (EMERGENCY)
Facility: HOSPITAL | Age: 25
LOS: 0 days | Discharge: HOME | End: 2020-02-25
Attending: EMERGENCY MEDICINE
Payer: COMMERCIAL

## 2020-02-25 VITALS
OXYGEN SATURATION: 100 % | DIASTOLIC BLOOD PRESSURE: 85 MMHG | TEMPERATURE: 98 F | SYSTOLIC BLOOD PRESSURE: 135 MMHG | HEART RATE: 85 BPM | RESPIRATION RATE: 18 BRPM

## 2020-02-25 VITALS
HEIGHT: 70 IN | TEMPERATURE: 98 F | RESPIRATION RATE: 18 BRPM | DIASTOLIC BLOOD PRESSURE: 91 MMHG | HEART RATE: 89 BPM | WEIGHT: 134.92 LBS | OXYGEN SATURATION: 100 % | SYSTOLIC BLOOD PRESSURE: 141 MMHG

## 2020-02-25 DIAGNOSIS — R25.1 TREMOR, UNSPECIFIED: ICD-10-CM

## 2020-02-25 DIAGNOSIS — R11.0 NAUSEA: ICD-10-CM

## 2020-02-25 DIAGNOSIS — F17.290 NICOTINE DEPENDENCE, OTHER TOBACCO PRODUCT, UNCOMPLICATED: ICD-10-CM

## 2020-02-25 DIAGNOSIS — Z79.899 OTHER LONG TERM (CURRENT) DRUG THERAPY: ICD-10-CM

## 2020-02-25 DIAGNOSIS — F60.3 BORDERLINE PERSONALITY DISORDER: ICD-10-CM

## 2020-02-25 DIAGNOSIS — F41.8 OTHER SPECIFIED ANXIETY DISORDERS: ICD-10-CM

## 2020-02-25 LAB
ALBUMIN SERPL ELPH-MCNC: 4 G/DL — SIGNIFICANT CHANGE UP (ref 3.3–5)
ALP SERPL-CCNC: 63 U/L — SIGNIFICANT CHANGE UP (ref 40–120)
ALT FLD-CCNC: 23 U/L — SIGNIFICANT CHANGE UP (ref 12–78)
ANION GAP SERPL CALC-SCNC: 4 MMOL/L — LOW (ref 5–17)
APAP SERPL-MCNC: <2 UG/ML — LOW (ref 10–30)
APPEARANCE UR: CLEAR — SIGNIFICANT CHANGE UP
AST SERPL-CCNC: 18 U/L — SIGNIFICANT CHANGE UP (ref 15–37)
BASOPHILS # BLD AUTO: 0.03 K/UL — SIGNIFICANT CHANGE UP (ref 0–0.2)
BASOPHILS NFR BLD AUTO: 0.3 % — SIGNIFICANT CHANGE UP (ref 0–2)
BILIRUB SERPL-MCNC: 0.3 MG/DL — SIGNIFICANT CHANGE UP (ref 0.2–1.2)
BILIRUB UR-MCNC: NEGATIVE — SIGNIFICANT CHANGE UP
BUN SERPL-MCNC: 8 MG/DL — SIGNIFICANT CHANGE UP (ref 7–23)
CALCIUM SERPL-MCNC: 8.8 MG/DL — SIGNIFICANT CHANGE UP (ref 8.5–10.1)
CHLORIDE SERPL-SCNC: 106 MMOL/L — SIGNIFICANT CHANGE UP (ref 96–108)
CK SERPL-CCNC: 66 U/L — SIGNIFICANT CHANGE UP (ref 26–308)
CO2 SERPL-SCNC: 28 MMOL/L — SIGNIFICANT CHANGE UP (ref 22–31)
COLOR SPEC: YELLOW — SIGNIFICANT CHANGE UP
CREAT SERPL-MCNC: 1.02 MG/DL — SIGNIFICANT CHANGE UP (ref 0.5–1.3)
DIFF PNL FLD: NEGATIVE — SIGNIFICANT CHANGE UP
EOSINOPHIL # BLD AUTO: 0.02 K/UL — SIGNIFICANT CHANGE UP (ref 0–0.5)
EOSINOPHIL NFR BLD AUTO: 0.2 % — SIGNIFICANT CHANGE UP (ref 0–6)
ETHANOL SERPL-MCNC: <10 MG/DL — SIGNIFICANT CHANGE UP (ref 0–10)
GLUCOSE SERPL-MCNC: 88 MG/DL — SIGNIFICANT CHANGE UP (ref 70–99)
GLUCOSE UR QL: NEGATIVE MG/DL — SIGNIFICANT CHANGE UP
HCT VFR BLD CALC: 45.9 % — SIGNIFICANT CHANGE UP (ref 39–50)
HGB BLD-MCNC: 15.1 G/DL — SIGNIFICANT CHANGE UP (ref 13–17)
IMM GRANULOCYTES NFR BLD AUTO: 0.2 % — SIGNIFICANT CHANGE UP (ref 0–1.5)
KETONES UR-MCNC: NEGATIVE — SIGNIFICANT CHANGE UP
LEUKOCYTE ESTERASE UR-ACNC: NEGATIVE — SIGNIFICANT CHANGE UP
LYMPHOCYTES # BLD AUTO: 0.93 K/UL — LOW (ref 1–3.3)
LYMPHOCYTES # BLD AUTO: 10.4 % — LOW (ref 13–44)
MCHC RBC-ENTMCNC: 29.5 PG — SIGNIFICANT CHANGE UP (ref 27–34)
MCHC RBC-ENTMCNC: 32.9 GM/DL — SIGNIFICANT CHANGE UP (ref 32–36)
MCV RBC AUTO: 89.6 FL — SIGNIFICANT CHANGE UP (ref 80–100)
MONOCYTES # BLD AUTO: 0.51 K/UL — SIGNIFICANT CHANGE UP (ref 0–0.9)
MONOCYTES NFR BLD AUTO: 5.7 % — SIGNIFICANT CHANGE UP (ref 2–14)
NEUTROPHILS # BLD AUTO: 7.47 K/UL — HIGH (ref 1.8–7.4)
NEUTROPHILS NFR BLD AUTO: 83.2 % — HIGH (ref 43–77)
NITRITE UR-MCNC: NEGATIVE — SIGNIFICANT CHANGE UP
PCP SPEC-MCNC: SIGNIFICANT CHANGE UP
PH UR: 7 — SIGNIFICANT CHANGE UP (ref 5–8)
PLATELET # BLD AUTO: 178 K/UL — SIGNIFICANT CHANGE UP (ref 150–400)
POTASSIUM SERPL-MCNC: 3.9 MMOL/L — SIGNIFICANT CHANGE UP (ref 3.5–5.3)
POTASSIUM SERPL-SCNC: 3.9 MMOL/L — SIGNIFICANT CHANGE UP (ref 3.5–5.3)
PROT SERPL-MCNC: 6.8 GM/DL — SIGNIFICANT CHANGE UP (ref 6–8.3)
PROT UR-MCNC: NEGATIVE MG/DL — SIGNIFICANT CHANGE UP
RBC # BLD: 5.12 M/UL — SIGNIFICANT CHANGE UP (ref 4.2–5.8)
RBC # FLD: 12.5 % — SIGNIFICANT CHANGE UP (ref 10.3–14.5)
SALICYLATES SERPL-MCNC: <1.7 MG/DL — LOW (ref 2.8–20)
SODIUM SERPL-SCNC: 138 MMOL/L — SIGNIFICANT CHANGE UP (ref 135–145)
SP GR SPEC: 1.01 — SIGNIFICANT CHANGE UP (ref 1.01–1.02)
UROBILINOGEN FLD QL: NEGATIVE MG/DL — SIGNIFICANT CHANGE UP
WBC # BLD: 8.98 K/UL — SIGNIFICANT CHANGE UP (ref 3.8–10.5)
WBC # FLD AUTO: 8.98 K/UL — SIGNIFICANT CHANGE UP (ref 3.8–10.5)

## 2020-02-25 PROCEDURE — 82550 ASSAY OF CK (CPK): CPT

## 2020-02-25 PROCEDURE — 99283 EMERGENCY DEPT VISIT LOW MDM: CPT

## 2020-02-25 PROCEDURE — 99284 EMERGENCY DEPT VISIT MOD MDM: CPT

## 2020-02-25 PROCEDURE — 80307 DRUG TEST PRSMV CHEM ANLYZR: CPT

## 2020-02-25 PROCEDURE — 80053 COMPREHEN METABOLIC PANEL: CPT

## 2020-02-25 PROCEDURE — 93010 ELECTROCARDIOGRAM REPORT: CPT

## 2020-02-25 PROCEDURE — 36415 COLL VENOUS BLD VENIPUNCTURE: CPT

## 2020-02-25 PROCEDURE — 85025 COMPLETE CBC W/AUTO DIFF WBC: CPT

## 2020-02-25 PROCEDURE — 93005 ELECTROCARDIOGRAM TRACING: CPT

## 2020-02-25 PROCEDURE — 81003 URINALYSIS AUTO W/O SCOPE: CPT

## 2020-02-25 RX ORDER — SODIUM CHLORIDE 9 MG/ML
1000 INJECTION INTRAMUSCULAR; INTRAVENOUS; SUBCUTANEOUS ONCE
Refills: 0 | Status: DISCONTINUED | OUTPATIENT
Start: 2020-02-25 | End: 2020-02-25

## 2020-02-25 NOTE — ED PROVIDER NOTE - NSFOLLOWUPINSTRUCTIONS_ED_ALL_ED_FT
Decrease your dose of Buspar back to 7.5 mg in the AM and 15 mg in the PM    Anxiety    Generalized anxiety disorder (RONNY) is a mental disorder. It is defined as anxiety that is not necessarily related to specific events or activities or is out of proportion to said events. Symptoms include restlessness, fatigue, difficulty concentrations, irritability and difficulty concentrating. It may interfere with life functions, including relationships, work, and school. If you were started on a medication, make sure to take exactly as prescribed and follow up with a psychiatrist.    SEEK IMMEDIATE MEDICAL CARE IF YOU HAVE ANY OF THE FOLLOWING SYMPTOMS: thoughts about hurting killing yourself, thoughts about hurting or killing somebody else, hallucinations, or worsening depression.

## 2020-02-25 NOTE — ED PROVIDER NOTE - PROGRESS NOTE DETAILS
Case discussed with Toxicology fellow Dr. Isaiah Kong who states patient doesn't have serotonin syndrome base on lack of clonus or AMS.  Pt's HR and BP have remained stable during his stay in the ED.  Pt is alert and oriented.  Toxicology advised to have patient decrease the Buspar back to the previous dose and to follow up with his psychiatrist.  They state he is safe for d/c.  Ivan Ballesteros, DO

## 2020-02-25 NOTE — ED ADULT TRIAGE NOTE - CHIEF COMPLAINT QUOTE
C/o nausea, generalized tremors starting 3 days ago. Had recent increase in Buspar 15mg to 2xday 3 days ago. Denies SI/HI

## 2020-02-25 NOTE — ED PROVIDER NOTE - PATIENT PORTAL LINK FT
You can access the FollowMyHealth Patient Portal offered by [facility] by registering at the following website: [email or web address]. By joining [facility]’s FollowMyHealth portal, you will also be able to view your health information using other applications (apps) compatible with our system.

## 2020-02-25 NOTE — ED ADULT NURSE NOTE - NSIMPLEMENTINTERV_GEN_ALL_ED
Implemented All Universal Safety Interventions:  [place] to call system. Call bell, personal items and telephone within reach. Instruct patient to call for assistance. Room bathroom lighting operational. Non-slip footwear when patient is off stretcher. Physically safe environment: no spills, clutter or unnecessary equipment. Stretcher in lowest position, wheels locked, appropriate side rails in place.

## 2020-02-25 NOTE — ED PROVIDER NOTE - CLINICAL SUMMARY MEDICAL DECISION MAKING FREE TEXT BOX
Pt on multiple medications that inhibit reuptake of serotonin with an increase in one of the medications.  Pt however doesn't exhibit any clonus or AMS.  Will check CK, EKG, toxicology labs and consult with toxicology fellow.  Pt denies SI or HI.

## 2020-02-25 NOTE — ED ADULT NURSE NOTE - OBJECTIVE STATEMENT
25 y/o male awake alert and oriented x4 presents to ED c/o nausea x couple of days. + weakness, nausea, diarrhea. Denies sick contact, recent travel, vomiting, fever/chills. pt had recent increase on Buspar 3 days ago.

## 2020-02-25 NOTE — ED PROVIDER NOTE - OBJECTIVE STATEMENT
25 y/o M with h/o borderline personality d/o, anxiety, depression p/w "feeling shaking", sweating, nausea and tremors that began about a week ago and increased about 24 hours ago.  He is on 200 mg of Zoloft, 250 mg of Seroquel, 0.5 mg of Klonopin BID, 150 mg of Wellbutrin which have been constant for months.  His psychiatrist increased his dose of Buspar from 7.5 mg in AM and 15 mg in the PM to 15 mg BID 3 days ago.  Pt denies any illicit drug use.  He denies taking more than prescribed medications.  He denies drinking alcohol.  Pt admits to vaping. He denies any SI/HI, hallucinations.  Pts psychiatrist is Dr. Gutierrez [phone]

## 2021-11-18 NOTE — PROGRESS NOTE BEHAVIORAL HEALTH - DETAILS
Patient belly button is bleeding with odor he had hernia surgery two years ago. He is scheduled for today but his car isn't starting .    Leave  if he doesn't answer superficial lacerations on legs pristiq = 'panic attacks' As per HPI - aborted suicide attempt via jumping in front of train (was driving to station and turned around); chronic passive suicidal ideation of wanting to be dead

## 2022-09-14 ENCOUNTER — TELEPHONE (OUTPATIENT)
Dept: FAMILY MEDICINE CLINIC | Facility: CLINIC | Age: 27
End: 2022-09-14

## 2022-09-15 ENCOUNTER — TELEMEDICINE (OUTPATIENT)
Dept: FAMILY MEDICINE CLINIC | Facility: CLINIC | Age: 27
End: 2022-09-15
Payer: COMMERCIAL

## 2022-09-15 VITALS — HEIGHT: 70 IN | WEIGHT: 150 LBS | BODY MASS INDEX: 21.47 KG/M2

## 2022-09-15 DIAGNOSIS — F42.8 OTHER OBSESSIVE-COMPULSIVE DISORDER: Primary | ICD-10-CM

## 2022-09-15 DIAGNOSIS — F32.5 MAJOR DEPRESSIVE DISORDER IN FULL REMISSION, UNSPECIFIED WHETHER RECURRENT (HCC): ICD-10-CM

## 2022-09-15 DIAGNOSIS — F41.1 GENERALIZED ANXIETY DISORDER: ICD-10-CM

## 2022-09-15 PROCEDURE — G8427 DOCREV CUR MEDS BY ELIG CLIN: HCPCS | Performed by: NURSE PRACTITIONER

## 2022-09-15 PROCEDURE — 99214 OFFICE O/P EST MOD 30 MIN: CPT | Performed by: NURSE PRACTITIONER

## 2022-09-15 PROCEDURE — G8420 CALC BMI NORM PARAMETERS: HCPCS | Performed by: NURSE PRACTITIONER

## 2022-09-15 PROCEDURE — 4004F PT TOBACCO SCREEN RCVD TLK: CPT | Performed by: NURSE PRACTITIONER

## 2022-09-15 RX ORDER — BUSPIRONE HYDROCHLORIDE 7.5 MG/1
7.5 TABLET ORAL 2 TIMES DAILY
Qty: 180 TABLET | Refills: 1 | Status: SHIPPED | OUTPATIENT
Start: 2022-09-15

## 2022-09-15 RX ORDER — BUSPIRONE HYDROCHLORIDE 5 MG/1
5 TABLET ORAL 2 TIMES DAILY
Qty: 180 TABLET | Refills: 1 | Status: SHIPPED | OUTPATIENT
Start: 2022-09-15

## 2022-09-15 RX ORDER — QUETIAPINE FUMARATE 300 MG/1
300 TABLET, FILM COATED ORAL NIGHTLY
Qty: 90 TABLET | Refills: 1 | Status: SHIPPED | OUTPATIENT
Start: 2022-09-15

## 2022-09-15 RX ORDER — BUPROPION HYDROCHLORIDE 150 MG/1
150 TABLET ORAL DAILY
Qty: 90 TABLET | Refills: 1 | Status: SHIPPED | OUTPATIENT
Start: 2022-09-15

## 2022-09-15 SDOH — ECONOMIC STABILITY: FOOD INSECURITY: WITHIN THE PAST 12 MONTHS, YOU WORRIED THAT YOUR FOOD WOULD RUN OUT BEFORE YOU GOT MONEY TO BUY MORE.: NEVER TRUE

## 2022-09-15 SDOH — ECONOMIC STABILITY: FOOD INSECURITY: WITHIN THE PAST 12 MONTHS, THE FOOD YOU BOUGHT JUST DIDN'T LAST AND YOU DIDN'T HAVE MONEY TO GET MORE.: NEVER TRUE

## 2022-09-15 ASSESSMENT — PATIENT HEALTH QUESTIONNAIRE - PHQ9
4. FEELING TIRED OR HAVING LITTLE ENERGY: 0
10. IF YOU CHECKED OFF ANY PROBLEMS, HOW DIFFICULT HAVE THESE PROBLEMS MADE IT FOR YOU TO DO YOUR WORK, TAKE CARE OF THINGS AT HOME, OR GET ALONG WITH OTHER PEOPLE: 0
5. POOR APPETITE OR OVEREATING: 0
8. MOVING OR SPEAKING SO SLOWLY THAT OTHER PEOPLE COULD HAVE NOTICED. OR THE OPPOSITE, BEING SO FIGETY OR RESTLESS THAT YOU HAVE BEEN MOVING AROUND A LOT MORE THAN USUAL: 0
6. FEELING BAD ABOUT YOURSELF - OR THAT YOU ARE A FAILURE OR HAVE LET YOURSELF OR YOUR FAMILY DOWN: 0
SUM OF ALL RESPONSES TO PHQ QUESTIONS 1-9: 0
SUM OF ALL RESPONSES TO PHQ QUESTIONS 1-9: 0
9. THOUGHTS THAT YOU WOULD BE BETTER OFF DEAD, OR OF HURTING YOURSELF: 0
SUM OF ALL RESPONSES TO PHQ9 QUESTIONS 1 & 2: 0
7. TROUBLE CONCENTRATING ON THINGS, SUCH AS READING THE NEWSPAPER OR WATCHING TELEVISION: 0
1. LITTLE INTEREST OR PLEASURE IN DOING THINGS: 0
SUM OF ALL RESPONSES TO PHQ QUESTIONS 1-9: 0
2. FEELING DOWN, DEPRESSED OR HOPELESS: 0
3. TROUBLE FALLING OR STAYING ASLEEP: 0
SUM OF ALL RESPONSES TO PHQ QUESTIONS 1-9: 0

## 2022-09-15 ASSESSMENT — SOCIAL DETERMINANTS OF HEALTH (SDOH): HOW HARD IS IT FOR YOU TO PAY FOR THE VERY BASICS LIKE FOOD, HOUSING, MEDICAL CARE, AND HEATING?: NOT HARD AT ALL

## 2022-09-15 NOTE — PROGRESS NOTES
Janeth Rodríguez (:  1995) is a Established patient, here for evaluation of the following: Follow up for anxiety OCD  Assessment & Plan   Below is the assessment and plan developed based on review of pertinent history, physical exam, labs, studies, and medications. 1. Other obsessive-compulsive disorder  -     buPROPion (WELLBUTRIN XL) 150 MG extended release tablet; Take 1 tablet by mouth daily, Disp-90 tablet, R-1Normal  -     QUEtiapine (SEROQUEL) 300 MG tablet; Take 1 tablet by mouth at bedtime, Disp-90 tablet, R-1Normal  2. Generalized anxiety disorder  -     busPIRone (BUSPAR) 7.5 MG tablet; Take 1 tablet by mouth 2 times daily, Disp-180 tablet, R-1Normal  -     busPIRone (BUSPAR) 5 MG tablet; Take 1 tablet by mouth 2 times daily, Disp-180 tablet, R-1Normal  -     QUEtiapine (SEROQUEL) 300 MG tablet; Take 1 tablet by mouth at bedtime, Disp-90 tablet, R-1Normal  3. Major depressive disorder in full remission, unspecified whether recurrent (HCC)  -     buPROPion (WELLBUTRIN XL) 150 MG extended release tablet; Take 1 tablet by mouth daily, Disp-90 tablet, R-1Normal  -     QUEtiapine (SEROQUEL) 300 MG tablet; Take 1 tablet by mouth at bedtime, Disp-90 tablet, R-1Normal  Return in about 3 months (around 12/15/2022). Refilled med's urged to return call to psych office and make apt looks like they contacted him on . Would like to hand off care for his mental health to them seems stable on med's refilled    Subjective   HPI doing well working as  stable on med's doing well Had a call from psych but has not called back to make apt is almost out of med's hence darrian pt today. Review of Systems   No issues tolerating meds    Objective   Patient-Reported Vitals  Patient-Reported Weight: 150lb  Patient-Reported Height: 5'10       Physical Exam  Looks well on video           Janeth Rodríguez, was evaluated through a synchronous (real-time) audio-video encounter.  The patient (or guardian if applicable) is aware that this is a billable service, which includes applicable co-pays. This Virtual Visit was conducted with patient's (and/or legal guardian's) consent. The visit was conducted pursuant to the emergency declaration under the [facility], [address] [place] [address] authority and the [employer] General Act. Patient identification was verified, and a caregiver was present when appropriate. The patient was located at Home: [address] [place] [id number]. Provider was located at [facility] Dept): [id number] [address],  [address].         --Laurita Aleman, APRN - NP

## 2022-11-18 DIAGNOSIS — F41.1 GENERALIZED ANXIETY DISORDER: ICD-10-CM

## 2022-11-18 DIAGNOSIS — F32.5 MAJOR DEPRESSIVE DISORDER IN FULL REMISSION, UNSPECIFIED WHETHER RECURRENT (HCC): ICD-10-CM

## 2022-11-18 DIAGNOSIS — F42.8 OTHER OBSESSIVE-COMPULSIVE DISORDER: ICD-10-CM

## 2022-11-18 RX ORDER — QUETIAPINE FUMARATE 300 MG/1
300 TABLET, FILM COATED ORAL NIGHTLY
Qty: 90 TABLET | Refills: 1 | Status: SHIPPED | OUTPATIENT
Start: 2022-11-18

## 2022-11-18 RX ORDER — BUPROPION HYDROCHLORIDE 150 MG/1
150 TABLET ORAL DAILY
Qty: 90 TABLET | Refills: 1 | Status: SHIPPED | OUTPATIENT
Start: 2022-11-18

## 2022-11-18 RX ORDER — BUSPIRONE HYDROCHLORIDE 7.5 MG/1
7.5 TABLET ORAL 2 TIMES DAILY
Qty: 180 TABLET | Refills: 1 | Status: SHIPPED | OUTPATIENT
Start: 2022-11-18

## 2022-11-19 NOTE — PROGRESS NOTES
Pt called after hours, about to head out of state, did not  his refills from September because he still had some of his old meds from [place] and his  baby had to stay in the NICU. By the time patient went to  meds, pharmacist told them it was too late and they needed a new order. This of course is nonsense, that order should be good for a year. Suggested patient see if another pharmacy would be OK with his insurance. Sent refills on Wellbutrin, Buspar/Seroquel so he does not run out while on trip to [place] for Thanksgiving.

## 2023-10-03 DIAGNOSIS — F42.8 OTHER OBSESSIVE-COMPULSIVE DISORDER: ICD-10-CM

## 2023-10-03 DIAGNOSIS — F32.5 MAJOR DEPRESSIVE DISORDER IN FULL REMISSION, UNSPECIFIED WHETHER RECURRENT (HCC): ICD-10-CM

## 2023-10-09 RX ORDER — BUPROPION HYDROCHLORIDE 150 MG/1
150 TABLET ORAL DAILY
Qty: 90 TABLET | Refills: 1 | OUTPATIENT
Start: 2023-10-09